# Patient Record
Sex: MALE | Race: WHITE | Employment: FULL TIME | URBAN - METROPOLITAN AREA
[De-identification: names, ages, dates, MRNs, and addresses within clinical notes are randomized per-mention and may not be internally consistent; named-entity substitution may affect disease eponyms.]

---

## 2017-02-16 ENCOUNTER — OFFICE VISIT (OUTPATIENT)
Dept: OTOLARYNGOLOGY | Facility: CLINIC | Age: 37
End: 2017-02-16

## 2017-02-16 ENCOUNTER — OFFICE VISIT (OUTPATIENT)
Dept: AUDIOLOGY | Facility: CLINIC | Age: 37
End: 2017-02-16

## 2017-02-16 VITALS
BODY MASS INDEX: 29.63 KG/M2 | SYSTOLIC BLOOD PRESSURE: 102 MMHG | DIASTOLIC BLOOD PRESSURE: 70 MMHG | WEIGHT: 207 LBS | HEIGHT: 70 IN | TEMPERATURE: 97 F

## 2017-02-16 DIAGNOSIS — R42 DIZZINESS: Primary | ICD-10-CM

## 2017-02-16 DIAGNOSIS — R09.81 NASAL CONGESTION: ICD-10-CM

## 2017-02-16 PROCEDURE — 92550 TYMPANOMETRY & REFLEX THRESH: CPT | Performed by: AUDIOLOGIST

## 2017-02-16 PROCEDURE — 99243 OFF/OP CNSLTJ NEW/EST LOW 30: CPT | Performed by: OTOLARYNGOLOGY

## 2017-02-16 PROCEDURE — 92582 CONDITIONING PLAY AUDIOMETRY: CPT | Performed by: AUDIOLOGIST

## 2017-02-16 PROCEDURE — 99212 OFFICE O/P EST SF 10 MIN: CPT | Performed by: OTOLARYNGOLOGY

## 2017-02-16 RX ORDER — AZELASTINE 1 MG/ML
2 SPRAY, METERED NASAL 2 TIMES DAILY
Qty: 1 BOTTLE | Refills: 0 | Status: SHIPPED | OUTPATIENT
Start: 2017-02-16 | End: 2017-11-09

## 2017-02-16 RX ORDER — PSEUDOEPHEDRINE HCL 120 MG/1
120 TABLET, FILM COATED, EXTENDED RELEASE ORAL EVERY 12 HOURS
Qty: 60 TABLET | Refills: 3 | Status: SHIPPED | OUTPATIENT
Start: 2017-02-16 | End: 2017-04-11

## 2017-02-16 RX ORDER — MONTELUKAST SODIUM 10 MG/1
10 TABLET ORAL NIGHTLY
Qty: 30 TABLET | Refills: 3 | Status: SHIPPED | OUTPATIENT
Start: 2017-02-16 | End: 2017-04-11

## 2017-02-17 NOTE — PROGRESS NOTES
AUDIOLOGY REPORT      Hari Roberson is a 30year old male     Referring Provider: Vesna Segura   YOB: 1980  Medical Record: [id number]      Patient Hearing History:  Patient reported dizziness.       Otoscopic Inspection:  Right ear:  No

## 2017-02-17 NOTE — PROGRESS NOTES
James Velez is a 30year old male.   Patient presents with:  Ear Wax: itchy ears for 4 weeks  Tonsil Problem: enlarged tonsils  Sinus Problem: sinus congestion,post nasal drip  Dizziness: dizziness for 3 weeks      HISTORY OF PRESENT ILLNESS    3 week Negative Tremors. Psych Negative Anxiety and depression. Integumentary Negative Frequent skin infections, pigment change and rash. Hema/Lymph Negative Easy bleeding and easy bruising.            PHYSICAL EXAM    /70 mmHg  Temp(Src) 97.3 °F (36.3 Disp: 30 tablet, Rfl: 3  •  Cyclobenzaprine HCl (FLEXERIL) 5 MG Oral Tab, Take 1 tablet (5 mg total) by mouth 3 (three) times daily as needed for Muscle spasms. , Disp: 20 tablet, Rfl: 0  •  Cetirizine HCl (ZYRTEC OR), Take 1 tablet by mouth as needed. , Dis

## 2017-03-14 ENCOUNTER — APPOINTMENT (OUTPATIENT)
Dept: GENERAL RADIOLOGY | Age: 37
End: 2017-03-14
Attending: EMERGENCY MEDICINE
Payer: COMMERCIAL

## 2017-03-14 ENCOUNTER — HOSPITAL ENCOUNTER (OUTPATIENT)
Age: 37
Discharge: HOME | End: 2017-03-14
Attending: EMERGENCY MEDICINE
Payer: COMMERCIAL

## 2017-03-14 VITALS
HEIGHT: 70 IN | WEIGHT: 205 LBS | DIASTOLIC BLOOD PRESSURE: 74 MMHG | HEART RATE: 74 BPM | RESPIRATION RATE: 16 BRPM | TEMPERATURE: 97 F | OXYGEN SATURATION: 99 % | BODY MASS INDEX: 29.35 KG/M2 | SYSTOLIC BLOOD PRESSURE: 110 MMHG

## 2017-03-14 DIAGNOSIS — J20.8 ACUTE VIRAL BRONCHITIS: Primary | ICD-10-CM

## 2017-03-14 PROCEDURE — 99214 OFFICE O/P EST MOD 30 MIN: CPT

## 2017-03-14 PROCEDURE — 71020 XR CHEST PA + LAT CHEST (CPT=71020): CPT

## 2017-03-14 PROCEDURE — 99213 OFFICE O/P EST LOW 20 MIN: CPT

## 2017-03-14 RX ORDER — BENZONATATE 200 MG/1
200 CAPSULE ORAL 3 TIMES DAILY PRN
Qty: 15 CAPSULE | Refills: 0 | Status: SHIPPED | OUTPATIENT
Start: 2017-03-14 | End: 2017-03-19

## 2017-03-14 NOTE — ED INITIAL ASSESSMENT (HPI)
REPORTS URI 1 A \"LITTLE OVER 1 MONTH AGO\", PATIENT THEN STATES A COUGH DEVELOPED. CHILDREN ILL ALSO DURING THAT TIME. REPORTS HE FEELS LIKE THE COUGH IS WORSENING.  DR. Doron Elizabeth AT THE BEDSIDE.

## 2017-03-14 NOTE — ED PROVIDER NOTES
Patient Seen in: [facility]    History   Patient presents with:  Cough/URI    Stated Complaint: cough/nausea    HPI  Patient with a runny nose, postnasal drip and cough for 3-4 weeks.   Patient was seen by ENT at the onset Status: Never Smoker                      Alcohol Use: Yes           0.0 oz/week       0 Standard drinks or equivalent per week       Comment: 3 x a month      Review of Systems    Positive for stated complaint: cough/nausea  Other systems are as noted in Coordination normal.   Skin: Skin is warm and dry. No rash noted. No pallor. Psychiatric: He has a normal mood and affect. His behavior is normal. Judgment normal.   Nursing note and vitals reviewed.     ED Course          XR CHEST PA + LAT CHEST (CPT=710

## 2017-04-11 ENCOUNTER — OFFICE VISIT (OUTPATIENT)
Dept: INTERNAL MEDICINE CLINIC | Facility: CLINIC | Age: 37
End: 2017-04-11

## 2017-04-11 ENCOUNTER — TELEPHONE (OUTPATIENT)
Dept: INTERNAL MEDICINE CLINIC | Facility: CLINIC | Age: 37
End: 2017-04-11

## 2017-04-11 VITALS
RESPIRATION RATE: 20 BRPM | TEMPERATURE: 98 F | BODY MASS INDEX: 28.49 KG/M2 | OXYGEN SATURATION: 95 % | SYSTOLIC BLOOD PRESSURE: 109 MMHG | HEIGHT: 70 IN | WEIGHT: 199 LBS | DIASTOLIC BLOOD PRESSURE: 72 MMHG | HEART RATE: 72 BPM

## 2017-04-11 DIAGNOSIS — R05.9 COUGH: Primary | ICD-10-CM

## 2017-04-11 DIAGNOSIS — J30.9 ALLERGIC RHINITIS, UNSPECIFIED ALLERGIC RHINITIS TRIGGER, UNSPECIFIED RHINITIS SEASONALITY: ICD-10-CM

## 2017-04-11 PROCEDURE — 99214 OFFICE O/P EST MOD 30 MIN: CPT | Performed by: INTERNAL MEDICINE

## 2017-04-11 PROCEDURE — 99212 OFFICE O/P EST SF 10 MIN: CPT | Performed by: INTERNAL MEDICINE

## 2017-04-11 RX ORDER — LORATADINE 10 MG/1
TABLET ORAL
Qty: 30 TABLET | Refills: 0 | Status: CANCELLED | OUTPATIENT
Start: 2017-04-11

## 2017-04-11 RX ORDER — LORATADINE 10 MG/1
10 TABLET ORAL DAILY
Qty: 90 TABLET | Refills: 0 | Status: SHIPPED | OUTPATIENT
Start: 2017-04-11 | End: 2017-11-09

## 2017-04-11 RX ORDER — AZITHROMYCIN 250 MG/1
TABLET, FILM COATED ORAL
Qty: 6 TABLET | Refills: 0 | Status: CANCELLED | OUTPATIENT
Start: 2017-04-11

## 2017-04-11 RX ORDER — ALBUTEROL SULFATE 90 UG/1
2 AEROSOL, METERED RESPIRATORY (INHALATION) 2 TIMES DAILY PRN
Qty: 1 INHALER | Refills: 0 | Status: CANCELLED | OUTPATIENT
Start: 2017-04-11

## 2017-04-11 RX ORDER — AZITHROMYCIN 250 MG/1
TABLET, FILM COATED ORAL
Qty: 6 TABLET | Refills: 0 | Status: SHIPPED | OUTPATIENT
Start: 2017-04-11 | End: 2017-04-15

## 2017-04-11 RX ORDER — ALBUTEROL SULFATE 90 UG/1
2 AEROSOL, METERED RESPIRATORY (INHALATION) EVERY 4 HOURS PRN
Qty: 1 INHALER | Refills: 1 | Status: SHIPPED | OUTPATIENT
Start: 2017-04-11 | End: 2020-06-10

## 2017-04-11 NOTE — TELEPHONE ENCOUNTER
Patient was just in today to see Dr. Joyce Cochran today at around 11 AM this morning and he states the  Was going to be prescribing several medications including a Z-Pack   Patient states he just called the pharmacy and they have nothing on file I did no

## 2017-04-11 NOTE — TELEPHONE ENCOUNTER
Please advise  Per 4/11/17 OV notes. Patient waiting for medications. Dr. Irwin Soto was metro paged.     Meds This Visit:  Pending Prescriptions  Disp  Refills     loratadine 10 MG Oral Tab  30 tablet  0      Sig: -2  Weeks than prn         azithromy

## 2017-04-11 NOTE — PROGRESS NOTES
HPI:    Patient ID: Keyon Dowling is a 30year old male. Bronchitis  This is a new problem. Episode onset:  10 days  The problem occurs daily. The problem has been gradually worsening.  Associated symptoms include congestion, coughing (with   ylleow p Inhalation Aero Soln Inhale 2 puffs into the lungs every 4 (four) hours as needed for Wheezing. Disp: 1 Inhaler Rfl: 1   loratadine (CLARITIN) 10 MG Oral Tab Take 1 tablet (10 mg total) by mouth daily.  Disp: 90 tablet Rfl: 0     Allergies:Not on File   PHY diagnosis)  z pack fluids  Rest , pt  Education  proair inh  Bid as  Needed     Allergic rhinitis, unspecified allergic rhinitis trigger, unspecified rhinitis seasonality  Patient advised to use Claritin 10 mg plane once daily for 1-2 weeks and then as nee

## 2017-08-24 ENCOUNTER — HOSPITAL ENCOUNTER (OUTPATIENT)
Age: 37
Discharge: HOME | End: 2017-08-24
Attending: FAMILY MEDICINE
Payer: COMMERCIAL

## 2017-08-24 VITALS
TEMPERATURE: 97 F | SYSTOLIC BLOOD PRESSURE: 106 MMHG | BODY MASS INDEX: 29.35 KG/M2 | RESPIRATION RATE: 16 BRPM | DIASTOLIC BLOOD PRESSURE: 89 MMHG | OXYGEN SATURATION: 98 % | HEART RATE: 86 BPM | WEIGHT: 205 LBS | HEIGHT: 70 IN

## 2017-08-24 DIAGNOSIS — K29.00 ACUTE GASTRITIS WITHOUT HEMORRHAGE, UNSPECIFIED GASTRITIS TYPE: Primary | ICD-10-CM

## 2017-08-24 PROCEDURE — 99213 OFFICE O/P EST LOW 20 MIN: CPT

## 2017-08-24 PROCEDURE — 99214 OFFICE O/P EST MOD 30 MIN: CPT

## 2017-08-24 RX ORDER — OMEPRAZOLE 20 MG/1
20 CAPSULE, DELAYED RELEASE ORAL EVERY MORNING
COMMUNITY
End: 2018-05-07

## 2017-08-24 RX ORDER — ONDANSETRON 4 MG/1
4 TABLET, ORALLY DISINTEGRATING ORAL ONCE
Status: COMPLETED | OUTPATIENT
Start: 2017-08-24 | End: 2017-08-24

## 2017-08-24 RX ORDER — ONDANSETRON 4 MG/1
4 TABLET, ORALLY DISINTEGRATING ORAL EVERY 4 HOURS PRN
Qty: 10 TABLET | Refills: 0 | Status: SHIPPED | OUTPATIENT
Start: 2017-08-24 | End: 2017-08-31

## 2017-08-24 NOTE — ED PROVIDER NOTES
Patient Seen in: [facility]    History   Patient presents with:  Abdomen/Flank Pain (GI/)    Stated Complaint: diarrhea    HPI    Pt is a 29 yo who presents with a 2 day h/o non bloody diarrhea and nausea. No fevers.  Ab otherwise stated in HPI.     Physical Exam   ED Triage Vitals [08/24/17 1440]  BP: 106/89  Pulse: 86  Resp: 16  Temp: (!) 97.4 °F (36.3 °C)  Temp src: Oral  SpO2: 98 %  O2 Device: None (Room air)    Current:/89   Pulse 86   Temp (!) 97.4 °F (36.3 °C)

## 2017-11-09 ENCOUNTER — APPOINTMENT (OUTPATIENT)
Dept: LAB | Facility: HOSPITAL | Age: 37
End: 2017-11-09
Attending: PHYSICIAN ASSISTANT
Payer: COMMERCIAL

## 2017-11-09 ENCOUNTER — OFFICE VISIT (OUTPATIENT)
Dept: GASTROENTEROLOGY | Facility: CLINIC | Age: 37
End: 2017-11-09

## 2017-11-09 VITALS
RESPIRATION RATE: 16 BRPM | DIASTOLIC BLOOD PRESSURE: 82 MMHG | WEIGHT: 211.81 LBS | TEMPERATURE: 98 F | SYSTOLIC BLOOD PRESSURE: 129 MMHG | HEIGHT: 71.5 IN | BODY MASS INDEX: 29.01 KG/M2 | HEART RATE: 71 BPM

## 2017-11-09 DIAGNOSIS — K20.0 EOSINOPHILIC ESOPHAGITIS: ICD-10-CM

## 2017-11-09 DIAGNOSIS — R10.11 RUQ DISCOMFORT: Primary | ICD-10-CM

## 2017-11-09 DIAGNOSIS — Z79.899 CURRENT USE OF PROTON PUMP INHIBITOR: ICD-10-CM

## 2017-11-09 DIAGNOSIS — R10.11 RUQ DISCOMFORT: ICD-10-CM

## 2017-11-09 PROCEDURE — 99244 OFF/OP CNSLTJ NEW/EST MOD 40: CPT | Performed by: PHYSICIAN ASSISTANT

## 2017-11-09 PROCEDURE — 80053 COMPREHEN METABOLIC PANEL: CPT

## 2017-11-09 PROCEDURE — 36415 COLL VENOUS BLD VENIPUNCTURE: CPT

## 2017-11-09 PROCEDURE — 99212 OFFICE O/P EST SF 10 MIN: CPT | Performed by: PHYSICIAN ASSISTANT

## 2017-11-09 PROCEDURE — 83735 ASSAY OF MAGNESIUM: CPT

## 2017-11-09 PROCEDURE — 82306 VITAMIN D 25 HYDROXY: CPT

## 2017-11-09 PROCEDURE — 82607 VITAMIN B-12: CPT

## 2017-11-09 NOTE — PATIENT INSTRUCTIONS
1. Benefiber daily - see how this works for your occasional loose stools. Avoiding greasy foods may help this as well. 2. US of the Abdomen + labs    3. Stay on the Omeprazole 20 mg each morning. 4. Complete labs for chronic PPI therapy.     Avoid He

## 2017-11-09 NOTE — H&P
[address] [facility] [address] Gastroenterology                                                                                                  Clinic History and Physical     Pa classic EoE triggers. Instead, he avoids greasy/spicy foods and this helps his reflux therefore he feels this is the best way to keep his upper GI health \"in check\". His BMs have been daily and essentially normal for him.  He will occasionally have lo Cancer Other      great uncle   • Heart Disorder Neg       Social History: Smoking status: Never Smoker                                                              Smokeless tobacco: Never Used                      Alcohol use: Yes           0.0 oz/week distension is appreciated, no overt masses, patient attempts to show me where his \"bulge\" discomfort is, however he is unable to locate it presently +BS  Back: No CVA tenderness  Skin: dry, warm, no jaundice  Ext: no cyanosis, clubbing or edema is eviden Visit:    Orders Placed This Encounter      Magnesium [E]      Vitamin B12 [E]      Vitamin D, 25-Hydroxy      Comp Metabolic Panel (14)    Meds This Visit:    No prescriptions requested or ordered in this encounter    Imaging & Referrals:  US ABDOMEN COMP

## 2017-11-10 ENCOUNTER — TELEPHONE (OUTPATIENT)
Dept: GASTROENTEROLOGY | Facility: CLINIC | Age: 37
End: 2017-11-10

## 2017-11-10 NOTE — TELEPHONE ENCOUNTER
I called patient at preferred number to let him know I discussed his case with Dr. Hugo Valdovinos who agrees with my recommendations. We may pursue additional imaging after the ultrasound if clinically warranted.      A [employer] prompt stated that the number is unav

## 2017-11-10 NOTE — TELEPHONE ENCOUNTER
Insufficient Vitamin D - patient can take 2000 IU OTC daily. I would advise him to follow up with Dr. Najma Brooks re: high protein/globulin. Remainder of labs were normal.    Unable to reach patient. Please disclose when he calls back.  He is not on M

## 2017-11-10 NOTE — TELEPHONE ENCOUNTER
Pt notified of results and Jorge Luis Corrales f/u recommendations. He verbalizes understanding. He states he can sign up for \"my chart\".

## 2018-05-07 ENCOUNTER — OFFICE VISIT (OUTPATIENT)
Dept: INTERNAL MEDICINE CLINIC | Facility: CLINIC | Age: 38
End: 2018-05-07

## 2018-05-07 VITALS
DIASTOLIC BLOOD PRESSURE: 73 MMHG | WEIGHT: 213.38 LBS | SYSTOLIC BLOOD PRESSURE: 121 MMHG | BODY MASS INDEX: 30.55 KG/M2 | HEIGHT: 70 IN | TEMPERATURE: 98 F | RESPIRATION RATE: 16 BRPM | HEART RATE: 82 BPM

## 2018-05-07 DIAGNOSIS — K21.9 GASTROESOPHAGEAL REFLUX DISEASE, ESOPHAGITIS PRESENCE NOT SPECIFIED: Primary | ICD-10-CM

## 2018-05-07 PROCEDURE — 99212 OFFICE O/P EST SF 10 MIN: CPT | Performed by: INTERNAL MEDICINE

## 2018-05-07 PROCEDURE — 99214 OFFICE O/P EST MOD 30 MIN: CPT | Performed by: INTERNAL MEDICINE

## 2018-05-07 NOTE — PROGRESS NOTES
HPI:    Patient ID: Nichelle Shaikh is a 41year old male. Gastro-esophageal Reflux   He reports no abdominal pain or no chest pain. This is a chronic problem. The current episode started more than 1 year ago. The problem occurs occasionally.  The probl Suspension Take 20 mg by mouth daily. Disp:  Rfl:      Allergies:  Bananas                 ITCHING   PHYSICAL EXAM:   Physical Exam   Constitutional: He is oriented to person, place, and time. He appears well-developed and well-nourished.    HENT:   Head: N Ashkan, 5/7/2018, 1:23 PM.      JUHI HURST MD,  personally performed the services described in this documentation. All medical record entries made by the scribe were at my direction and in my presence.   I have reviewed the chart and discharge ins

## 2018-09-28 ENCOUNTER — HOSPITAL ENCOUNTER (OUTPATIENT)
Age: 38
Discharge: HOME | End: 2018-09-28
Attending: EMERGENCY MEDICINE
Payer: COMMERCIAL

## 2018-09-28 VITALS
HEIGHT: 70 IN | DIASTOLIC BLOOD PRESSURE: 76 MMHG | SYSTOLIC BLOOD PRESSURE: 135 MMHG | HEART RATE: 70 BPM | OXYGEN SATURATION: 97 % | TEMPERATURE: 98 F | RESPIRATION RATE: 18 BRPM | WEIGHT: 200 LBS | BODY MASS INDEX: 28.63 KG/M2

## 2018-09-28 DIAGNOSIS — H10.10 ALLERGIC CONJUNCTIVITIS, UNSPECIFIED LATERALITY: Primary | ICD-10-CM

## 2018-09-28 DIAGNOSIS — J30.2 SEASONAL ALLERGIES: ICD-10-CM

## 2018-09-28 PROCEDURE — 99214 OFFICE O/P EST MOD 30 MIN: CPT

## 2018-09-28 PROCEDURE — 99213 OFFICE O/P EST LOW 20 MIN: CPT

## 2018-09-28 RX ORDER — FLUTICASONE PROPIONATE 50 MCG
1-2 SPRAY, SUSPENSION (ML) NASAL DAILY
Qty: 16 G | Refills: 0 | Status: SHIPPED | OUTPATIENT
Start: 2018-09-28 | End: 2018-09-28

## 2018-09-28 RX ORDER — OLOPATADINE HYDROCHLORIDE 1 MG/ML
1 SOLUTION/ DROPS OPHTHALMIC 2 TIMES DAILY
Qty: 1 BOTTLE | Refills: 0 | Status: SHIPPED | OUTPATIENT
Start: 2018-09-28 | End: 2018-09-28

## 2018-09-28 RX ORDER — FLUTICASONE PROPIONATE 50 MCG
1-2 SPRAY, SUSPENSION (ML) NASAL DAILY
Qty: 16 G | Refills: 0 | Status: SHIPPED | OUTPATIENT
Start: 2018-09-28 | End: 2018-10-28

## 2018-09-28 RX ORDER — MONTELUKAST SODIUM 10 MG/1
10 TABLET ORAL NIGHTLY
Qty: 30 TABLET | Refills: 0 | Status: SHIPPED | OUTPATIENT
Start: 2018-09-28 | End: 2018-09-28

## 2018-09-28 RX ORDER — OLOPATADINE HYDROCHLORIDE 1 MG/ML
1 SOLUTION/ DROPS OPHTHALMIC 2 TIMES DAILY
Qty: 1 BOTTLE | Refills: 0 | Status: SHIPPED | OUTPATIENT
Start: 2018-09-28 | End: 2020-06-10

## 2018-09-28 RX ORDER — MONTELUKAST SODIUM 10 MG/1
10 TABLET ORAL NIGHTLY
Qty: 30 TABLET | Refills: 0 | Status: SHIPPED | OUTPATIENT
Start: 2018-09-28 | End: 2018-10-28

## 2018-09-28 NOTE — ED PROVIDER NOTES
Patient Seen in: [facility]    History   Patient presents with:   Eye Visual Problem (opthalmic)    Stated Complaint: eye pain     HPI  For the last many days patient has noted increased runny nose, nasal congestion and it All other systems reviewed and negative except as noted above.     Physical Exam     ED Triage Vitals [09/28/18 1210]   /76   Pulse 70   Resp 18   Temp 97.9 °F (36.6 °C)   Temp src Oral   SpO2 97 %   O2 Device None (Room air)       Current:/ discussed with the patient at length. Strategies for allergy treatment and resuming his previous allergy regimen was advised. He was given renewed prescriptions. Follow-up, return and home management was discussed.   If symptoms worsen or new or concerni

## 2019-03-14 ENCOUNTER — APPOINTMENT (OUTPATIENT)
Dept: GENERAL RADIOLOGY | Age: 39
End: 2019-03-14
Attending: NURSE PRACTITIONER
Payer: COMMERCIAL

## 2019-03-14 ENCOUNTER — HOSPITAL ENCOUNTER (OUTPATIENT)
Age: 39
Discharge: HOME | End: 2019-03-14
Payer: COMMERCIAL

## 2019-03-14 VITALS
HEIGHT: 70 IN | WEIGHT: 205 LBS | HEART RATE: 114 BPM | DIASTOLIC BLOOD PRESSURE: 69 MMHG | BODY MASS INDEX: 29.35 KG/M2 | SYSTOLIC BLOOD PRESSURE: 119 MMHG | TEMPERATURE: 101 F | OXYGEN SATURATION: 96 % | RESPIRATION RATE: 20 BRPM

## 2019-03-14 DIAGNOSIS — J11.1 INFLUENZA: Primary | ICD-10-CM

## 2019-03-14 LAB
POCT INFLUENZA A: POSITIVE
POCT INFLUENZA B: NEGATIVE
S PYO AG THROAT QL: NEGATIVE

## 2019-03-14 PROCEDURE — 87502 INFLUENZA DNA AMP PROBE: CPT | Performed by: NURSE PRACTITIONER

## 2019-03-14 PROCEDURE — 99214 OFFICE O/P EST MOD 30 MIN: CPT

## 2019-03-14 PROCEDURE — 99213 OFFICE O/P EST LOW 20 MIN: CPT

## 2019-03-14 PROCEDURE — 87430 STREP A AG IA: CPT

## 2019-03-14 PROCEDURE — 71046 X-RAY EXAM CHEST 2 VIEWS: CPT | Performed by: NURSE PRACTITIONER

## 2019-03-14 RX ORDER — ALBUTEROL SULFATE 90 UG/1
2 AEROSOL, METERED RESPIRATORY (INHALATION) EVERY 4 HOURS PRN
Qty: 1 INHALER | Refills: 0 | Status: SHIPPED | OUTPATIENT
Start: 2019-03-14 | End: 2019-04-13

## 2019-03-14 RX ORDER — PREDNISONE 20 MG/1
40 TABLET ORAL DAILY
Qty: 10 TABLET | Refills: 0 | Status: SHIPPED | OUTPATIENT
Start: 2019-03-14 | End: 2019-03-19

## 2019-03-14 NOTE — ED PROVIDER NOTES
No chief complaint on file. HPI:     Leah Rangel is a 42year old male with no significant past medical history presents with a chief complaint of cough, runny nose, vomiting, sore throat and shortness of breath.   Patient reports symptoms started patient and he declines at this time. X-ray findings reviewed, no acute cardiopulmonary disease. Patient is nontoxic in appearance, awake and alert throughout examination. Easy respirations with clear speech. Discussed findings with patient.   Also disc

## 2019-03-14 NOTE — ED INITIAL ASSESSMENT (HPI)
PATIENT ARRIVED AMBULATORY TO ROOM WITH FAMILY. SYMPTOMS STARTED YESTERDAY. +CONGESTED COUGH. +NASAL CONGESTION. +VOMITING. EASY NONLABORED RESPIRATIONS.

## 2019-07-10 ENCOUNTER — OFFICE VISIT (OUTPATIENT)
Dept: GASTROENTEROLOGY | Facility: CLINIC | Age: 39
End: 2019-07-10
Payer: COMMERCIAL

## 2019-07-10 ENCOUNTER — APPOINTMENT (OUTPATIENT)
Dept: LAB | Age: 39
End: 2019-07-10
Attending: INTERNAL MEDICINE
Payer: COMMERCIAL

## 2019-07-10 VITALS
SYSTOLIC BLOOD PRESSURE: 111 MMHG | HEIGHT: 70 IN | BODY MASS INDEX: 29.44 KG/M2 | WEIGHT: 205.63 LBS | HEART RATE: 97 BPM | DIASTOLIC BLOOD PRESSURE: 67 MMHG

## 2019-07-10 DIAGNOSIS — R10.11 RUQ ABDOMINAL PAIN: ICD-10-CM

## 2019-07-10 DIAGNOSIS — R19.4 CHANGE IN BOWEL HABITS: ICD-10-CM

## 2019-07-10 DIAGNOSIS — K20.0 EOSINOPHILIC ESOPHAGITIS: ICD-10-CM

## 2019-07-10 DIAGNOSIS — K20.0 EOSINOPHILIC ESOPHAGITIS: Primary | ICD-10-CM

## 2019-07-10 LAB
ALBUMIN SERPL-MCNC: 4.2 G/DL (ref 3.4–5)
ALBUMIN/GLOB SERPL: 0.9 {RATIO} (ref 1–2)
ALP LIVER SERPL-CCNC: 73 U/L (ref 45–117)
ALT SERPL-CCNC: 24 U/L (ref 16–61)
ANION GAP SERPL CALC-SCNC: 6 MMOL/L (ref 0–18)
AST SERPL-CCNC: 15 U/L (ref 15–37)
BILIRUB SERPL-MCNC: 0.6 MG/DL (ref 0.1–2)
BUN BLD-MCNC: 18 MG/DL (ref 7–18)
BUN/CREAT SERPL: 14.5 (ref 10–20)
CALCIUM BLD-MCNC: 9.5 MG/DL (ref 8.5–10.1)
CHLORIDE SERPL-SCNC: 100 MMOL/L (ref 98–112)
CO2 SERPL-SCNC: 28 MMOL/L (ref 21–32)
CREAT BLD-MCNC: 1.24 MG/DL (ref 0.7–1.3)
GLOBULIN PLAS-MCNC: 4.9 G/DL (ref 2.8–4.4)
GLUCOSE BLD-MCNC: 85 MG/DL (ref 70–99)
IGA SERPL-MCNC: 529 MG/DL (ref 70–312)
M PROTEIN MFR SERPL ELPH: 9.1 G/DL (ref 6.4–8.2)
OSMOLALITY SERPL CALC.SUM OF ELEC: 279 MOSM/KG (ref 275–295)
PATIENT FASTING: NO
POTASSIUM SERPL-SCNC: 3.7 MMOL/L (ref 3.5–5.1)
SODIUM SERPL-SCNC: 134 MMOL/L (ref 136–145)

## 2019-07-10 PROCEDURE — 36415 COLL VENOUS BLD VENIPUNCTURE: CPT

## 2019-07-10 PROCEDURE — 82784 ASSAY IGA/IGD/IGG/IGM EACH: CPT

## 2019-07-10 PROCEDURE — 86256 FLUORESCENT ANTIBODY TITER: CPT

## 2019-07-10 PROCEDURE — 83516 IMMUNOASSAY NONANTIBODY: CPT

## 2019-07-10 PROCEDURE — 99214 OFFICE O/P EST MOD 30 MIN: CPT | Performed by: INTERNAL MEDICINE

## 2019-07-10 PROCEDURE — 80053 COMPREHEN METABOLIC PANEL: CPT

## 2019-07-10 RX ORDER — CLOBETASOL PROPIONATE 0.5 MG/G
0.05 AEROSOL, FOAM TOPICAL AS DIRECTED
Refills: 5 | COMMUNITY
Start: 2019-07-01 | End: 2020-08-14

## 2019-07-10 NOTE — PROGRESS NOTES
HPI:    Patient ID: Saravanan Zepeda returns today for follow-up. We discussed several issues today:    1. Eosinophilic esophagitis and dysphagia    Esophageal symptoms are tolerable. He states swallowing is \"not really an issue. \"  In retrospect, he and subsequent diagnosis of eosinophilic esophagitis. Comes in taking Claritin, fluticasone/Flonase, omeprazole oral suspension. Results letter from last time was mailed to a previous address from 2012. He never received it.   Comes in asking about h 9:30 PM in the emergency room. For over 2 hours, he was unable to swallow secretions or saliva without regurgitating it back up. Assessment in the emergency room was that he appeared to be completely obstructed.   After retching, vomiting up the meat bolu no back pain    Past medical history: Gastroesophageal reflux disease  Past surgical history: No significant  Social history: The patient is a nonsmoker    Past Medical History  History of Medical Problems:  Yes  GI History:  GERD    Allergies  Allergies: 1712 (PCP)        Ameena Gastelum MD       [id number]  Patient Instructions:  ED Foreign Body Esophageal Rslv      Sid Wellington MD        [id number]    =======================    Angela Dent    Date of Exam:  04/02/2013 4/02/2013    ULTRASOUND OF THE ABDOM and risks of EGD examination and esophageal dilation were discussed with  Bhavin Coates and his questions answered, his informed consent was obtained.  He was interviewed and examined, sedated by the anesthesiologist and nurse anesthetist. Once sedated, the Kateryna here. Biopsies taken of the distal and mid esophagus. Air and secretions were suctioned out of the stomach and esophagus and the scope was withdrawn from the patient. He tolerated this relatively brief procedure well. IMPRESSION:   1.  History and findings June 11, 2015. Symptoms were very concerning for possibilities including eosinophilic esophagitis, simple peptic esophageal stricture, less likely neoplasm.     EGD examination with esophageal biopsy and dilation of esophageal stricture performed June 19, approximately since 2012, worsening past 2 years  · Reassuring ultrasound 2013 as above. · Repeat abdominal ultrasound ordered today. · Celiac serologies ordered today.   · Could consider EGD examination in the future        Over 25 minutes spent today di

## 2019-07-15 LAB — TTG IGA SER-ACNC: 2 U/ML (ref ?–7)

## 2019-08-05 ENCOUNTER — TELEPHONE (OUTPATIENT)
Dept: GASTROENTEROLOGY | Facility: CLINIC | Age: 39
End: 2019-08-05

## 2019-08-05 NOTE — TELEPHONE ENCOUNTER
----- Message from Carmenza Gale MD sent at 8/3/2019  2:43 PM CDT -----  GI RNs–  Please call Anais Yaritza Miller to advise that his recent blood tests 7/10/2019 came back looking good. He tested negative for celiac disease.   His liver enzymes looked go

## 2019-08-14 ENCOUNTER — TELEPHONE (OUTPATIENT)
Dept: GASTROENTEROLOGY | Facility: CLINIC | Age: 39
End: 2019-08-14

## 2019-10-11 ENCOUNTER — APPOINTMENT (OUTPATIENT)
Dept: GENERAL RADIOLOGY | Age: 39
End: 2019-10-11
Attending: EMERGENCY MEDICINE
Payer: COMMERCIAL

## 2019-10-11 ENCOUNTER — HOSPITAL ENCOUNTER (OUTPATIENT)
Age: 39
Discharge: HOME | End: 2019-10-11
Attending: EMERGENCY MEDICINE
Payer: COMMERCIAL

## 2019-10-11 VITALS
SYSTOLIC BLOOD PRESSURE: 121 MMHG | OXYGEN SATURATION: 98 % | DIASTOLIC BLOOD PRESSURE: 60 MMHG | BODY MASS INDEX: 29 KG/M2 | WEIGHT: 205 LBS | HEART RATE: 76 BPM | RESPIRATION RATE: 18 BRPM | TEMPERATURE: 98 F

## 2019-10-11 DIAGNOSIS — M79.675 PAIN OF TOE OF LEFT FOOT: Primary | ICD-10-CM

## 2019-10-11 PROCEDURE — 99214 OFFICE O/P EST MOD 30 MIN: CPT

## 2019-10-11 PROCEDURE — 99213 OFFICE O/P EST LOW 20 MIN: CPT

## 2019-10-11 PROCEDURE — 73630 X-RAY EXAM OF FOOT: CPT | Performed by: EMERGENCY MEDICINE

## 2019-10-11 RX ORDER — CEFADROXIL 500 MG/1
500 CAPSULE ORAL 2 TIMES DAILY
Qty: 20 CAPSULE | Refills: 0 | Status: SHIPPED | OUTPATIENT
Start: 2019-10-11 | End: 2019-10-21

## 2019-10-11 NOTE — ED PROVIDER NOTES
Patient Seen in: [facility]      History   Patient presents with:  Musculoskeletal Problem    Stated Complaint: LEFT MIDDLE TOE PAIN DIFFICULTY WALKING    HPI    Patient complains of left third toe pain for the last 3 day Exam    The patient is awake and alert  Extremities on exam of the left foot there is mild diffuse swelling of the left third toe. There is erythema present over the distal phalanx. There are no wounds or abrasions noted.   There is tenderness on palpatio capsule (500 mg total) by mouth 2 (two) times daily for 10 days.   Qty: 20 capsule Refills: 0

## 2019-10-28 ENCOUNTER — HOSPITAL ENCOUNTER (OUTPATIENT)
Age: 39
Discharge: HOME | End: 2019-10-28
Payer: COMMERCIAL

## 2019-10-28 VITALS
HEIGHT: 70 IN | SYSTOLIC BLOOD PRESSURE: 117 MMHG | WEIGHT: 197 LBS | TEMPERATURE: 99 F | DIASTOLIC BLOOD PRESSURE: 77 MMHG | OXYGEN SATURATION: 97 % | RESPIRATION RATE: 18 BRPM | BODY MASS INDEX: 28.2 KG/M2 | HEART RATE: 105 BPM

## 2019-10-28 DIAGNOSIS — J02.0 STREPTOCOCCAL SORE THROAT: Primary | ICD-10-CM

## 2019-10-28 PROCEDURE — 99214 OFFICE O/P EST MOD 30 MIN: CPT

## 2019-10-28 PROCEDURE — 87430 STREP A AG IA: CPT

## 2019-10-28 PROCEDURE — 99213 OFFICE O/P EST LOW 20 MIN: CPT

## 2019-10-28 RX ORDER — AMOXICILLIN 875 MG/1
875 TABLET, COATED ORAL 2 TIMES DAILY
Qty: 20 TABLET | Refills: 0 | Status: SHIPPED | OUTPATIENT
Start: 2019-10-28 | End: 2019-11-07

## 2019-10-28 NOTE — ED PROVIDER NOTES
Patient presents with:  Sore Throat      HPI:     Amber Jesus is a 42year old male with no significant past medical history presents with chief complaint of sore throat and tactile fever which started Saturday.   No posterior neck pain or neck stiffne tolerated in the past.  Patient verbalized plan of care and states understanding.         Orders Placed This Encounter      POCT Rapid Strep Once      POCT Rapid Strep      amoxicillin 875 MG Oral Tab          Sig: Take 1 tablet (875 mg total) by mouth 2 (t

## 2019-10-31 ENCOUNTER — OFFICE VISIT (OUTPATIENT)
Dept: PODIATRY CLINIC | Facility: CLINIC | Age: 39
End: 2019-10-31
Payer: COMMERCIAL

## 2019-10-31 DIAGNOSIS — D36.10 NEUROMA: Primary | ICD-10-CM

## 2019-10-31 PROCEDURE — 99203 OFFICE O/P NEW LOW 30 MIN: CPT | Performed by: PODIATRIST

## 2019-10-31 NOTE — PROGRESS NOTES
HPI:    Patient ID: Judy Oreilly is a 42year old male. This 63-year-old male presents as a new patient to me and states that he is self-referred. He is here because of pain primarily in the left forefoot.   He describes a sense of swelling on the bal taken at the urgent care center in the last week that is negative for bone pathology. He states that they actually put him on an antibiotic and it made no difference. Symptoms are quite consistent and classic for neuroma second interspace left foot.   He

## 2019-12-02 ENCOUNTER — APPOINTMENT (OUTPATIENT)
Dept: ULTRASOUND IMAGING | Facility: HOSPITAL | Age: 39
End: 2019-12-02
Payer: COMMERCIAL

## 2019-12-02 ENCOUNTER — HOSPITAL ENCOUNTER (OUTPATIENT)
Age: 39
Discharge: ANOTHER HOSPITAL | End: 2019-12-02
Payer: COMMERCIAL

## 2019-12-02 ENCOUNTER — HOSPITAL ENCOUNTER (EMERGENCY)
Facility: HOSPITAL | Age: 39
Discharge: HOME | End: 2019-12-02
Payer: COMMERCIAL

## 2019-12-02 VITALS
WEIGHT: 180 LBS | TEMPERATURE: 98 F | RESPIRATION RATE: 18 BRPM | SYSTOLIC BLOOD PRESSURE: 114 MMHG | DIASTOLIC BLOOD PRESSURE: 75 MMHG | HEART RATE: 85 BPM | OXYGEN SATURATION: 96 % | HEIGHT: 70 IN | BODY MASS INDEX: 25.77 KG/M2

## 2019-12-02 VITALS
OXYGEN SATURATION: 98 % | RESPIRATION RATE: 18 BRPM | WEIGHT: 192 LBS | TEMPERATURE: 98 F | SYSTOLIC BLOOD PRESSURE: 121 MMHG | HEART RATE: 72 BPM | BODY MASS INDEX: 28 KG/M2 | DIASTOLIC BLOOD PRESSURE: 84 MMHG

## 2019-12-02 DIAGNOSIS — M71.21 SYNOVIAL CYST OF RIGHT POPLITEAL SPACE: Primary | ICD-10-CM

## 2019-12-02 DIAGNOSIS — M79.89 SWELLING OF CALF: Primary | ICD-10-CM

## 2019-12-02 DIAGNOSIS — M79.661 RIGHT CALF PAIN: ICD-10-CM

## 2019-12-02 PROCEDURE — 99213 OFFICE O/P EST LOW 20 MIN: CPT

## 2019-12-02 PROCEDURE — 93971 EXTREMITY STUDY: CPT

## 2019-12-02 PROCEDURE — 99284 EMERGENCY DEPT VISIT MOD MDM: CPT

## 2019-12-02 PROCEDURE — 99212 OFFICE O/P EST SF 10 MIN: CPT

## 2019-12-02 RX ORDER — NAPROXEN 500 MG/1
500 TABLET ORAL 2 TIMES DAILY PRN
Qty: 20 TABLET | Refills: 0 | Status: SHIPPED | OUTPATIENT
Start: 2019-12-02 | End: 2019-12-09

## 2019-12-02 NOTE — ED PROVIDER NOTES
Patient presents with:  Knee Pain      HPI:     [facility] is a 42year old male who presents today with a chief complaint of pain in the right knee that started a couple days ago.   The patient states he was doing some exercises recommended for tendi on file    Lifestyle      Physical activity:        Days per week: Not on file        Minutes per session: Not on file      Stress: Not on file    Relationships      Social connections:        Talks on phone: Not on file        Gets together: Not on file Wt 87.1 kg   SpO2 98%   BMI 27.55 kg/m²   GENERAL: well developed, well nourished, well hydrated, no distress  SKIN: good skin turgor, no obvious rashes. No redness to the RLE. Skin intact.    HEENT: atraumatic, normocephalic, ears, nose and throat are cl

## 2019-12-02 NOTE — ED INITIAL ASSESSMENT (HPI)
PATIENT STATES 2 WEEKS AGO HE DID SOME RIGHT KNEE EXERCISES FOR TENDONITIS. STATES HE DEVELOPED RIGHT KNEE PAIN AFTER THE EXERCISES FOLLOWED BY SOME SWELLING. NOW WITH SWELLING TO RIGHT CALF. DENIES RECENT TRAVEL.   TAKING IBUPROFEN FOR PAIN WITHOUT RELI

## 2019-12-03 NOTE — ED PROVIDER NOTES
Patient Seen in: [facility] AND [facility]      History   Patient presents with:  Leg Pain    Stated Complaint: R leg pain     HPI    82-year-old male presents from immediate care for evaluation of possible DVT.   Patient with right knee and motion and neck supple. No neck rigidity. Cardiovascular:      Rate and Rhythm: Normal rate and regular rhythm. Pulses:           Posterior tibial pulses are 1+ on the right side and 1+ on the left side.    Pulmonary:      Effort: Pulmonary effort is

## 2019-12-03 NOTE — ED NOTES
Pt dcd to home aox4, ambulatory with steady gait, discharge instruction given and voices understanding, prescription given, denies any concern

## 2019-12-23 ENCOUNTER — HOSPITAL ENCOUNTER (OUTPATIENT)
Dept: GENERAL RADIOLOGY | Facility: HOSPITAL | Age: 39
Discharge: HOME | End: 2019-12-23
Attending: ORTHOPAEDIC SURGERY
Payer: COMMERCIAL

## 2019-12-23 ENCOUNTER — OFFICE VISIT (OUTPATIENT)
Dept: ORTHOPEDICS CLINIC | Facility: CLINIC | Age: 39
End: 2019-12-23
Payer: COMMERCIAL

## 2019-12-23 VITALS
WEIGHT: 180 LBS | BODY MASS INDEX: 25.77 KG/M2 | DIASTOLIC BLOOD PRESSURE: 78 MMHG | HEIGHT: 70 IN | SYSTOLIC BLOOD PRESSURE: 120 MMHG | HEART RATE: 75 BPM

## 2019-12-23 DIAGNOSIS — M25.561 RIGHT KNEE PAIN, UNSPECIFIED CHRONICITY: ICD-10-CM

## 2019-12-23 DIAGNOSIS — M25.461 EFFUSION OF RIGHT KNEE: ICD-10-CM

## 2019-12-23 DIAGNOSIS — M25.561 RIGHT KNEE PAIN, UNSPECIFIED CHRONICITY: Primary | ICD-10-CM

## 2019-12-23 PROCEDURE — 99243 OFF/OP CNSLTJ NEW/EST LOW 30: CPT | Performed by: ORTHOPAEDIC SURGERY

## 2019-12-23 PROCEDURE — 20610 DRAIN/INJ JOINT/BURSA W/O US: CPT | Performed by: ORTHOPAEDIC SURGERY

## 2019-12-23 PROCEDURE — 73564 X-RAY EXAM KNEE 4 OR MORE: CPT | Performed by: ORTHOPAEDIC SURGERY

## 2019-12-23 RX ORDER — TRIAMCINOLONE ACETONIDE 40 MG/ML
40 INJECTION, SUSPENSION INTRA-ARTICULAR; INTRAMUSCULAR ONCE
Status: COMPLETED | OUTPATIENT
Start: 2019-12-23 | End: 2019-12-23

## 2019-12-23 NOTE — PROGRESS NOTES
NURSING INTAKE COMMENTS: Patient presents with:  Consult: C/o right knee pain for about 6 weeks. Pain occurs when bending, or when sitting. Stts he's noticed some instability and swelling. Recalls no injuries. Has taken ibuprofen with relief.       HPI: use: Yes        Alcohol/week: 0.0 standard drinks        Comment: 3 x a month      Drug use: No      Sexual activity: Not on file       Review of Systems:  GENERAL: feels generally well, no significant weight loss or weight gain  SKIN: no ulcerated or worr small saphenous, posterior tibial, and peroneal veins appear normal.    THROMBI: None visible. COMPRESSIBILITY: Normal. OTHER: Mildly complex Baker's cyst popliteal fossa measures 9.2 x 2.2 x 2.9 cm.   Complex low level echoes may be from previous hemorrhag typos.    Wilber Whitt MD

## 2019-12-24 NOTE — PROGRESS NOTES
Verbal order given by Dr. Paolo Bernal to draw up 4 cc 0.5% marcaine, and 1 cc kenalog 40 for a right knee injection.   Also, set up an aspiration right knee

## 2020-02-12 NOTE — TELEPHONE ENCOUNTER
2nd reminder sent via [employer] on 11/27/19.    3rd reminder letter mailed out to patient today.

## 2020-05-04 ENCOUNTER — E-VISIT (OUTPATIENT)
Dept: FAMILY MEDICINE CLINIC | Facility: CLINIC | Age: 40
End: 2020-05-04

## 2020-05-04 DIAGNOSIS — J45.909 ASTHMA DUE TO SEASONAL ALLERGIES: Primary | ICD-10-CM

## 2020-05-04 PROCEDURE — 99421 OL DIG E/M SVC 5-10 MIN: CPT | Performed by: NURSE PRACTITIONER

## 2020-05-04 RX ORDER — MONTELUKAST SODIUM 10 MG/1
10 TABLET ORAL NIGHTLY
Qty: 60 TABLET | Refills: 0 | Status: SHIPPED | OUTPATIENT
Start: 2020-05-04 | End: 2020-06-10

## 2020-05-04 RX ORDER — ALBUTEROL SULFATE 90 UG/1
AEROSOL, METERED RESPIRATORY (INHALATION)
Qty: 1 INHALER | Refills: 0 | Status: SHIPPED | OUTPATIENT
Start: 2020-05-04

## 2020-05-04 NOTE — PROGRESS NOTES
Judy  is a 47year old male. HPI:   See answers to questions above.      Current Outpatient Medications   Medication Sig Dispense Refill   • Albuterol Sulfate  (90 Base) MCG/ACT Inhalation Aero Soln 2 puffs every four hours as needed for appointment. Advised that if no improvement after 3 days in cough that he should see PCP for possible phone or video visit. Educated on medication side effects, risks and benefits. Educated on worsening symptoms and when to seek a higher level of care.  Jeanine Maldonado

## 2020-05-09 NOTE — PATIENT INSTRUCTIONS
Seasonal Allergy  Seasonal allergy is also called hay fever. An allergic reaction may occur after a person is exposed to pollens released from grasses, weeds, trees, and shrubs.  This type of allergy occurs during the spring, summer, or fall when pollens · Antihistamines block the release of histamine during the allergic response. They may work better when taken before symptoms develop.  Unless a prescription antihistamine was prescribed, you can take over-the-counter antihistamines that don't cause drowsin Call your healthcare provider right away for any of the following:  · Facial, ear or sinus pain; colored drainage from the nose  · Headaches  · You have asthma and your asthma symptoms don't respond to the usual doses of your medicine  · Cough with colored

## 2020-06-10 ENCOUNTER — OFFICE VISIT (OUTPATIENT)
Dept: INTERNAL MEDICINE CLINIC | Facility: CLINIC | Age: 40
End: 2020-06-10
Payer: COMMERCIAL

## 2020-06-10 VITALS
SYSTOLIC BLOOD PRESSURE: 125 MMHG | BODY MASS INDEX: 28.49 KG/M2 | HEIGHT: 70 IN | HEART RATE: 73 BPM | TEMPERATURE: 98 F | DIASTOLIC BLOOD PRESSURE: 89 MMHG | WEIGHT: 199 LBS

## 2020-06-10 DIAGNOSIS — M25.50 ARTHRALGIA, UNSPECIFIED JOINT: ICD-10-CM

## 2020-06-10 DIAGNOSIS — Z00.00 PHYSICAL EXAM: Primary | ICD-10-CM

## 2020-06-10 DIAGNOSIS — J45.909 ASTHMA DUE TO SEASONAL ALLERGIES: ICD-10-CM

## 2020-06-10 PROCEDURE — 99395 PREV VISIT EST AGE 18-39: CPT | Performed by: PHYSICIAN ASSISTANT

## 2020-06-10 RX ORDER — MONTELUKAST SODIUM 10 MG/1
10 TABLET ORAL NIGHTLY
Qty: 60 TABLET | Refills: 0 | Status: SHIPPED | OUTPATIENT
Start: 2020-06-10 | End: 2020-08-09

## 2020-06-10 NOTE — PROGRESS NOTES
HPI:    Patient ID: Jaye Gonzalez is a 47year old male. HPI   Patient is new to me presents for a physical. States dong generally well. Does have intermittent joint pains, left shoulder and elbow, right knee. Been worse over the past year.  Did play uncle   • Heart Disorder Neg          PHYSICAL EXAM:   /89 (BP Location: Left arm, Patient Position: Sitting, Cuff Size: adult)   Pulse 73   Temp 97.5 °F (36.4 °C) (Oral)   Ht 5' 10\" (1.778 m)   Wt 199 lb (90.3 kg)   BMI 28.55 kg/m²   Body mass inde tablet; Refill: 0    3.  Arthralgia, unspecified joint  -morning stretches and continue physical activity, tylenol PRN no advil due to GERD, follow up with ortho for knee pain  Orders Placed This Encounter      CBC W Differential W Platelet [E]      Comp Me

## 2020-06-11 ENCOUNTER — LAB ENCOUNTER (OUTPATIENT)
Dept: LAB | Age: 40
End: 2020-06-11
Attending: ANESTHESIOLOGY
Payer: COMMERCIAL

## 2020-06-11 DIAGNOSIS — Z00.00 PHYSICAL EXAM: ICD-10-CM

## 2020-06-11 PROCEDURE — 81003 URINALYSIS AUTO W/O SCOPE: CPT

## 2020-06-11 PROCEDURE — 80053 COMPREHEN METABOLIC PANEL: CPT

## 2020-06-11 PROCEDURE — 85025 COMPLETE CBC W/AUTO DIFF WBC: CPT

## 2020-06-11 PROCEDURE — 36415 COLL VENOUS BLD VENIPUNCTURE: CPT

## 2020-06-11 PROCEDURE — 80061 LIPID PANEL: CPT

## 2020-06-11 PROCEDURE — 84443 ASSAY THYROID STIM HORMONE: CPT

## 2020-08-10 ENCOUNTER — APPOINTMENT (OUTPATIENT)
Dept: GENERAL RADIOLOGY | Age: 40
End: 2020-08-10
Attending: NURSE PRACTITIONER
Payer: COMMERCIAL

## 2020-08-10 ENCOUNTER — HOSPITAL ENCOUNTER (OUTPATIENT)
Age: 40
Discharge: HOME | End: 2020-08-10
Payer: COMMERCIAL

## 2020-08-10 VITALS
SYSTOLIC BLOOD PRESSURE: 119 MMHG | OXYGEN SATURATION: 97 % | HEART RATE: 84 BPM | DIASTOLIC BLOOD PRESSURE: 76 MMHG | TEMPERATURE: 98 F | RESPIRATION RATE: 18 BRPM

## 2020-08-10 DIAGNOSIS — R05.9 COUGH: Primary | ICD-10-CM

## 2020-08-10 PROCEDURE — 99214 OFFICE O/P EST MOD 30 MIN: CPT

## 2020-08-10 PROCEDURE — 71046 X-RAY EXAM CHEST 2 VIEWS: CPT | Performed by: NURSE PRACTITIONER

## 2020-08-10 RX ORDER — PREDNISONE 20 MG/1
40 TABLET ORAL DAILY
Qty: 10 TABLET | Refills: 0 | Status: SHIPPED | OUTPATIENT
Start: 2020-08-10 | End: 2020-08-15

## 2020-08-10 NOTE — ED PROVIDER NOTES
Patient presents with:  Cough/URI      HPI:     Felipa Zuleta is a 47year old male with a past history of asthma presents with cough and wheezing over the course of the last 8 days. No shortness of breath or chest pain.   States he does have albuterol respirations. Discussed with him viral most likely. He does have albuterol inhaler at home and states he does not need a refill. He has been on prednisone in the past for similar symptoms and states he tolerated that well.   I will hold off on any antibi

## 2020-08-12 LAB — SARS-COV-2 RNA RESP QL NAA+PROBE: NOT DETECTED

## 2020-08-14 ENCOUNTER — HOSPITAL ENCOUNTER (OUTPATIENT)
Dept: GENERAL RADIOLOGY | Age: 40
Discharge: HOME | End: 2020-08-14
Attending: INTERNAL MEDICINE
Payer: COMMERCIAL

## 2020-08-14 ENCOUNTER — APPOINTMENT (OUTPATIENT)
Dept: LAB | Age: 40
End: 2020-08-14
Attending: INTERNAL MEDICINE
Payer: COMMERCIAL

## 2020-08-14 ENCOUNTER — OFFICE VISIT (OUTPATIENT)
Dept: INTERNAL MEDICINE CLINIC | Facility: CLINIC | Age: 40
End: 2020-08-14
Payer: COMMERCIAL

## 2020-08-14 VITALS
WEIGHT: 202.88 LBS | DIASTOLIC BLOOD PRESSURE: 82 MMHG | SYSTOLIC BLOOD PRESSURE: 121 MMHG | TEMPERATURE: 98 F | HEIGHT: 69.5 IN | BODY MASS INDEX: 29.37 KG/M2 | HEART RATE: 79 BPM

## 2020-08-14 DIAGNOSIS — R41.3 MEMORY DIFFICULTIES: Primary | ICD-10-CM

## 2020-08-14 DIAGNOSIS — M54.6 LEFT-SIDED THORACIC BACK PAIN, UNSPECIFIED CHRONICITY: ICD-10-CM

## 2020-08-14 DIAGNOSIS — R41.3 MEMORY DIFFICULTIES: ICD-10-CM

## 2020-08-14 DIAGNOSIS — R41.840 DIFFICULTY CONCENTRATING: ICD-10-CM

## 2020-08-14 LAB — VIT B12 SERPL-MCNC: 465 PG/ML (ref 193–986)

## 2020-08-14 PROCEDURE — 3074F SYST BP LT 130 MM HG: CPT | Performed by: INTERNAL MEDICINE

## 2020-08-14 PROCEDURE — 3008F BODY MASS INDEX DOCD: CPT | Performed by: INTERNAL MEDICINE

## 2020-08-14 PROCEDURE — 82607 VITAMIN B-12: CPT

## 2020-08-14 PROCEDURE — 72080 X-RAY EXAM THORACOLMB 2/> VW: CPT | Performed by: INTERNAL MEDICINE

## 2020-08-14 PROCEDURE — 99214 OFFICE O/P EST MOD 30 MIN: CPT | Performed by: INTERNAL MEDICINE

## 2020-08-14 PROCEDURE — 3079F DIAST BP 80-89 MM HG: CPT | Performed by: INTERNAL MEDICINE

## 2020-08-14 PROCEDURE — 36415 COLL VENOUS BLD VENIPUNCTURE: CPT

## 2020-08-15 PROBLEM — R41.3 MEMORY DIFFICULTIES: Status: ACTIVE | Noted: 2020-08-15

## 2020-08-15 PROBLEM — M54.6 LEFT-SIDED THORACIC BACK PAIN: Status: ACTIVE | Noted: 2020-08-15

## 2020-08-15 PROBLEM — R41.840 DIFFICULTY CONCENTRATING: Status: ACTIVE | Noted: 2020-08-15

## 2020-08-15 NOTE — PROGRESS NOTES
HPI:    Patient ID: Margret Burkitt is a 47year old male. Patient presents with:  Lab Results: Concerned about some of the recent lab results especially kidney labs. Back Pain: Left lower flank pain. Onset 6 months ago. Pain with pressure.    Concentr eye.     • Albuterol Sulfate  (90 Base) MCG/ACT Inhalation Aero Soln 2 puffs every four hours as needed for wheezing/cough.  1 Inhaler 0   • loratadine (CLARITIN) 10 MG Oral Tab 1-2  Weeks than prn 30 tablet 0     Allergies:No Known Allergies   PHYSI Remember well asking what  Was  That ?      Waking  Up =  Urinates  Usually  3   Times at night and wakes up   Lot   Per patient he drinks a lot of water especially at night    Drinks 16  Ounce  3-4   Per  Day      working -  IT remotely      Nursing not

## 2020-08-28 ENCOUNTER — TELEMEDICINE (OUTPATIENT)
Dept: INTERNAL MEDICINE CLINIC | Facility: CLINIC | Age: 40
End: 2020-08-28

## 2020-08-28 DIAGNOSIS — H10.13 ALLERGIC CONJUNCTIVITIS OF BOTH EYES: Primary | ICD-10-CM

## 2020-08-28 PROCEDURE — 99213 OFFICE O/P EST LOW 20 MIN: CPT | Performed by: PHYSICIAN ASSISTANT

## 2020-08-28 NOTE — PROGRESS NOTES
This is a telemedicine visit with live, interactive video and audio. Patient understands and accepts financial responsibility for any deductible, co-insurance and/or co-pays associated with this service.     SUBJECTIVE  Presents with two days of red, it

## 2020-10-26 ENCOUNTER — HOSPITAL ENCOUNTER (OUTPATIENT)
Age: 40
Discharge: HOME | End: 2020-10-26
Attending: EMERGENCY MEDICINE
Payer: COMMERCIAL

## 2020-10-26 VITALS
HEART RATE: 89 BPM | HEIGHT: 70 IN | OXYGEN SATURATION: 96 % | DIASTOLIC BLOOD PRESSURE: 87 MMHG | RESPIRATION RATE: 20 BRPM | SYSTOLIC BLOOD PRESSURE: 124 MMHG | BODY MASS INDEX: 28.63 KG/M2 | WEIGHT: 200 LBS | TEMPERATURE: 97 F

## 2020-10-26 DIAGNOSIS — J06.9 UPPER RESPIRATORY TRACT INFECTION, UNSPECIFIED TYPE: Primary | ICD-10-CM

## 2020-10-26 PROCEDURE — 99213 OFFICE O/P EST LOW 20 MIN: CPT

## 2020-10-26 PROCEDURE — 87430 STREP A AG IA: CPT

## 2020-10-26 NOTE — ED PROVIDER NOTES
Patient Seen in: [facility]      History   Patient presents with:  Cough/URI    Stated Complaint: cold symptoms    HPI    48 yo male with sore throat that started yesterday, today cough and congestion. No documented fever.  No known COVID expo normal.              ED Course     Labs Reviewed   [facility] POCT RAPID STREP - Normal   SARS-COV-2 RNA,QUAL RT-PCR ([facility])                  MDM                         Disposition and Plan     Clinical Impression:  Upper respiratory tract infection, unspecified

## 2020-12-17 ENCOUNTER — HOSPITAL ENCOUNTER (OUTPATIENT)
Dept: GENERAL RADIOLOGY | Age: 40
Discharge: HOME | End: 2020-12-17
Attending: PODIATRIST
Payer: COMMERCIAL

## 2020-12-17 ENCOUNTER — OFFICE VISIT (OUTPATIENT)
Dept: PODIATRY CLINIC | Facility: CLINIC | Age: 40
End: 2020-12-17
Payer: COMMERCIAL

## 2020-12-17 VITALS — BODY MASS INDEX: 29.35 KG/M2 | HEIGHT: 70 IN | WEIGHT: 205 LBS

## 2020-12-17 DIAGNOSIS — M10.072 ACUTE IDIOPATHIC GOUT INVOLVING TOE OF LEFT FOOT: ICD-10-CM

## 2020-12-17 DIAGNOSIS — M79.672 FOOT PAIN, LEFT: ICD-10-CM

## 2020-12-17 DIAGNOSIS — M79.672 FOOT PAIN, LEFT: Primary | ICD-10-CM

## 2020-12-17 PROCEDURE — 3008F BODY MASS INDEX DOCD: CPT | Performed by: PODIATRIST

## 2020-12-17 PROCEDURE — 99213 OFFICE O/P EST LOW 20 MIN: CPT | Performed by: PODIATRIST

## 2020-12-17 PROCEDURE — 73630 X-RAY EXAM OF FOOT: CPT | Performed by: PODIATRIST

## 2020-12-17 RX ORDER — METHYLPREDNISOLONE 4 MG/1
TABLET ORAL
Qty: 1 PACKAGE | Refills: 0 | Status: SHIPPED | OUTPATIENT
Start: 2020-12-17 | End: 2021-11-23

## 2020-12-17 RX ORDER — METHYLPREDNISOLONE 4 MG/1
TABLET ORAL
Qty: 1 PACKAGE | Refills: 0 | Status: CANCELLED
Start: 2020-12-17

## 2020-12-17 NOTE — PROGRESS NOTES
HPI:    Patient ID: Randi Becerra is a 47year old male. 43-year-old male presents to the office having not been seen in approximately 1 year. He has a new concern today that his pain on the left forefoot.   The pain is been present for a little more t him on a Medrol Dosepak. I reviewed the use of the medication, concerns, and expectations. I instructed him that I would anticipate rather dramatic and significant improvement in the next 2 to 4 days.   If it anytime he thinks it is becoming worse or the

## 2021-07-26 ENCOUNTER — HOSPITAL ENCOUNTER (OUTPATIENT)
Age: 41
Discharge: HOME | End: 2021-07-26
Payer: COMMERCIAL

## 2021-07-26 VITALS
DIASTOLIC BLOOD PRESSURE: 77 MMHG | OXYGEN SATURATION: 97 % | TEMPERATURE: 97 F | SYSTOLIC BLOOD PRESSURE: 111 MMHG | HEART RATE: 90 BPM | RESPIRATION RATE: 20 BRPM

## 2021-07-26 DIAGNOSIS — H60.331 ACUTE SWIMMER'S EAR OF RIGHT SIDE: Primary | ICD-10-CM

## 2021-07-26 PROCEDURE — 99213 OFFICE O/P EST LOW 20 MIN: CPT

## 2021-07-26 RX ORDER — OFLOXACIN 3 MG/ML
SOLUTION AURICULAR (OTIC) DAILY
Qty: 5 ML | Refills: 0 | Status: SHIPPED | OUTPATIENT
Start: 2021-07-26 | End: 2021-08-02

## 2021-07-27 NOTE — ED PROVIDER NOTES
Patient Seen in: [facility]      History   Patient presents with:  Ear Problem Pain    Stated Complaint: right ear pain    HPI/Subjective:   HPI    This is a well appearing 45 y/o who presents with a chief complaint of ear pain.  Pt reports th and external ear normal. No mastoid tenderness. Tympanic membrane is not erythematous. Left Ear: Tympanic membrane, ear canal and external ear normal.      Ears:      Comments: +ear canal erythematous and swollen.  No mastoid tenderness      Nose: Nose I explained to the patient that emergent conditions may arise and to go to the ER for new, worsening or any persistent conditions. All questions answered. No acute distress and cleared for home.     Disposition and Plan     Clinical Impression:  Acute swimm

## 2021-07-27 NOTE — ED INITIAL ASSESSMENT (HPI)
Patient with right ear pain starting  Yesterday. Denies uri symptoms states he did swim on Thursday. Denies fevers, denies drainage from ear.

## 2021-08-27 ENCOUNTER — OFFICE VISIT (OUTPATIENT)
Dept: OTOLARYNGOLOGY | Facility: CLINIC | Age: 41
End: 2021-08-27
Payer: COMMERCIAL

## 2021-08-27 VITALS — HEIGHT: 70 IN | WEIGHT: 215 LBS | BODY MASS INDEX: 30.78 KG/M2

## 2021-08-27 DIAGNOSIS — H61.23 BILATERAL IMPACTED CERUMEN: Primary | ICD-10-CM

## 2021-08-27 PROCEDURE — 69210 REMOVE IMPACTED EAR WAX UNI: CPT | Performed by: OTOLARYNGOLOGY

## 2021-08-27 PROCEDURE — 3008F BODY MASS INDEX DOCD: CPT | Performed by: OTOLARYNGOLOGY

## 2021-08-27 NOTE — PROGRESS NOTES
Oswald Molina is a 46year old male.   Patient presents with:  Ear Problem: pt is here today for an ear cleaning and he is having a pain in his right ear, he did go to urgent care a few weeks ago and got ear drops and it went away but now this week his e ENDOSCOPY PERFORMED  6/2015   • VASECTOMY  7/1/16    Dr. Niels Spurling Neg/Pos Details   Constitutional Negative Fatigue, fever and weight loss. ENMT Negative Drooling. Eyes Negative Blurred vision and vision changes.    Respirato •  Albuterol Sulfate  (90 Base) MCG/ACT Inhalation Aero Soln, 2 puffs every four hours as needed for wheezing/cough. , Disp: 1 Inhaler, Rfl: 0  •  loratadine (CLARITIN) 10 MG Oral Tab, 1-2  Weeks than prn, Disp: 30 tablet, Rfl: 0  •  methylPREDNISo

## 2021-11-23 ENCOUNTER — OFFICE VISIT (OUTPATIENT)
Dept: INTERNAL MEDICINE CLINIC | Facility: CLINIC | Age: 41
End: 2021-11-23
Payer: COMMERCIAL

## 2021-11-23 ENCOUNTER — MED REC SCAN ONLY (OUTPATIENT)
Dept: INTERNAL MEDICINE CLINIC | Facility: CLINIC | Age: 41
End: 2021-11-23

## 2021-11-23 VITALS
SYSTOLIC BLOOD PRESSURE: 113 MMHG | HEIGHT: 70 IN | DIASTOLIC BLOOD PRESSURE: 70 MMHG | WEIGHT: 208 LBS | HEART RATE: 59 BPM | BODY MASS INDEX: 29.78 KG/M2

## 2021-11-23 DIAGNOSIS — K21.9 GERD WITH STRICTURE: ICD-10-CM

## 2021-11-23 DIAGNOSIS — Z00.00 PE (PHYSICAL EXAM), ROUTINE: Primary | ICD-10-CM

## 2021-11-23 DIAGNOSIS — L40.9 PSORIASIS: ICD-10-CM

## 2021-11-23 DIAGNOSIS — K22.2 GERD WITH STRICTURE: ICD-10-CM

## 2021-11-23 PROCEDURE — 99395 PREV VISIT EST AGE 18-39: CPT | Performed by: INTERNAL MEDICINE

## 2021-11-23 PROCEDURE — 3078F DIAST BP <80 MM HG: CPT | Performed by: INTERNAL MEDICINE

## 2021-11-23 PROCEDURE — 3074F SYST BP LT 130 MM HG: CPT | Performed by: INTERNAL MEDICINE

## 2021-11-23 PROCEDURE — 90471 IMMUNIZATION ADMIN: CPT | Performed by: INTERNAL MEDICINE

## 2021-11-23 PROCEDURE — 99212 OFFICE O/P EST SF 10 MIN: CPT | Performed by: INTERNAL MEDICINE

## 2021-11-23 PROCEDURE — 3008F BODY MASS INDEX DOCD: CPT | Performed by: INTERNAL MEDICINE

## 2021-11-23 PROCEDURE — 90686 IIV4 VACC NO PRSV 0.5 ML IM: CPT | Performed by: INTERNAL MEDICINE

## 2021-11-23 NOTE — PROGRESS NOTES
Subjective:   Patient ID: Leah Rangel is a 46year old male.   Patient presents with:  Physical    Patient presents today for physical exam, states doing well otherwise, denies chest pain, shortness of breath, dyspnea on exertion or heart palpitations and nursing note reviewed. Constitutional:       General: He is not in acute distress. Appearance: He is well-developed. Interventions: Face mask in place. HENT:      Head: Normocephalic and atraumatic.       Right Ear: Tympanic membrane, ear c Preventative health maintenance tests reviewed   Immunizations reviewed  -patient need flu shot today   needs tetanus shot full schedule next visit time  -  Patient verbalized understanding and compliance       GERD with stricture    Omeprazole 40 mg lenny

## 2022-05-17 ENCOUNTER — LAB ENCOUNTER (OUTPATIENT)
Dept: LAB | Age: 42
End: 2022-05-17
Attending: NURSE PRACTITIONER
Payer: COMMERCIAL

## 2022-05-17 ENCOUNTER — TELEMEDICINE (OUTPATIENT)
Dept: TELEHEALTH | Age: 42
End: 2022-05-17

## 2022-05-17 VITALS — TEMPERATURE: 99 F

## 2022-05-17 DIAGNOSIS — U07.1 COVID-19 VIRUS INFECTION: ICD-10-CM

## 2022-05-17 DIAGNOSIS — U07.1 COVID-19 VIRUS INFECTION: Primary | ICD-10-CM

## 2022-05-17 PROCEDURE — 99212 OFFICE O/P EST SF 10 MIN: CPT | Performed by: NURSE PRACTITIONER

## 2022-05-18 LAB — SARS-COV-2 RNA RESP QL NAA+PROBE: DETECTED

## 2022-05-30 ENCOUNTER — APPOINTMENT (OUTPATIENT)
Dept: GENERAL RADIOLOGY | Age: 42
End: 2022-05-30
Attending: EMERGENCY MEDICINE
Payer: COMMERCIAL

## 2022-05-30 ENCOUNTER — HOSPITAL ENCOUNTER (OUTPATIENT)
Age: 42
Discharge: HOME | End: 2022-05-30
Attending: EMERGENCY MEDICINE
Payer: COMMERCIAL

## 2022-05-30 VITALS
TEMPERATURE: 98 F | SYSTOLIC BLOOD PRESSURE: 128 MMHG | HEART RATE: 85 BPM | RESPIRATION RATE: 18 BRPM | OXYGEN SATURATION: 96 % | DIASTOLIC BLOOD PRESSURE: 80 MMHG

## 2022-05-30 DIAGNOSIS — S92.352A DISPLACED FRACTURE OF FIFTH METATARSAL BONE, LEFT FOOT, INITIAL ENCOUNTER FOR CLOSED FRACTURE: Primary | ICD-10-CM

## 2022-05-30 PROCEDURE — 99214 OFFICE O/P EST MOD 30 MIN: CPT

## 2022-05-30 PROCEDURE — 73630 X-RAY EXAM OF FOOT: CPT | Performed by: EMERGENCY MEDICINE

## 2022-05-31 ENCOUNTER — OFFICE VISIT (OUTPATIENT)
Dept: PODIATRY CLINIC | Facility: CLINIC | Age: 42
End: 2022-05-31
Payer: COMMERCIAL

## 2022-05-31 DIAGNOSIS — S92.355A CLOSED NONDISPLACED FRACTURE OF FIFTH METATARSAL BONE OF LEFT FOOT, INITIAL ENCOUNTER: Primary | ICD-10-CM

## 2022-05-31 PROCEDURE — 99243 OFF/OP CNSLTJ NEW/EST LOW 30: CPT | Performed by: PODIATRIST

## 2022-06-09 ENCOUNTER — TELEPHONE (OUTPATIENT)
Dept: PODIATRY CLINIC | Facility: CLINIC | Age: 42
End: 2022-06-09

## 2022-06-09 ENCOUNTER — OFFICE VISIT (OUTPATIENT)
Dept: PODIATRY CLINIC | Facility: CLINIC | Age: 42
End: 2022-06-09
Payer: COMMERCIAL

## 2022-06-09 ENCOUNTER — HOSPITAL ENCOUNTER (OUTPATIENT)
Dept: GENERAL RADIOLOGY | Age: 42
Discharge: HOME | End: 2022-06-09
Attending: PODIATRIST
Payer: COMMERCIAL

## 2022-06-09 DIAGNOSIS — Z47.89 ORTHOPEDIC AFTERCARE: ICD-10-CM

## 2022-06-09 DIAGNOSIS — Z47.89 ORTHOPEDIC AFTERCARE: Primary | ICD-10-CM

## 2022-06-09 DIAGNOSIS — S92.355D CLOSED NONDISPLACED FRACTURE OF FIFTH METATARSAL BONE OF LEFT FOOT WITH ROUTINE HEALING, SUBSEQUENT ENCOUNTER: Primary | ICD-10-CM

## 2022-06-09 PROCEDURE — L4387 NON-PNEUM WALK BOOT PRE OTS: HCPCS | Performed by: PODIATRIST

## 2022-06-09 PROCEDURE — 73630 X-RAY EXAM OF FOOT: CPT | Performed by: PODIATRIST

## 2022-06-09 PROCEDURE — 99213 OFFICE O/P EST LOW 20 MIN: CPT | Performed by: PODIATRIST

## 2022-06-09 NOTE — TELEPHONE ENCOUNTER
Per Dr Natalie Calabrese request a new order for x-rays of L foot was entered and patient will be notified to come in 15 minutes earlier for his ana cristina today and have the x-ray done first . Juan Israel

## 2022-06-09 NOTE — TELEPHONE ENCOUNTER
Patient was called relayed Dr Nelson Pappas to have Xray done before appt. Pt verbalized understanding.

## 2022-06-29 ENCOUNTER — HOSPITAL ENCOUNTER (OUTPATIENT)
Dept: GENERAL RADIOLOGY | Facility: HOSPITAL | Age: 42
Discharge: HOME | End: 2022-06-29
Attending: PODIATRIST
Payer: COMMERCIAL

## 2022-06-29 ENCOUNTER — OFFICE VISIT (OUTPATIENT)
Dept: PODIATRY CLINIC | Facility: CLINIC | Age: 42
End: 2022-06-29
Payer: COMMERCIAL

## 2022-06-29 DIAGNOSIS — S92.355D CLOSED NONDISPLACED FRACTURE OF FIFTH METATARSAL BONE OF LEFT FOOT WITH ROUTINE HEALING, SUBSEQUENT ENCOUNTER: ICD-10-CM

## 2022-06-29 DIAGNOSIS — Z47.89 ORTHOPEDIC AFTERCARE: Primary | ICD-10-CM

## 2022-06-29 DIAGNOSIS — Z47.89 ORTHOPEDIC AFTERCARE: ICD-10-CM

## 2022-06-29 PROCEDURE — 73630 X-RAY EXAM OF FOOT: CPT | Performed by: PODIATRIST

## 2022-06-29 PROCEDURE — 99213 OFFICE O/P EST LOW 20 MIN: CPT | Performed by: PODIATRIST

## 2022-07-31 ENCOUNTER — HOSPITAL ENCOUNTER (OUTPATIENT)
Age: 42
Discharge: HOME | End: 2022-07-31
Attending: EMERGENCY MEDICINE
Payer: COMMERCIAL

## 2022-07-31 VITALS
SYSTOLIC BLOOD PRESSURE: 122 MMHG | RESPIRATION RATE: 21 BRPM | DIASTOLIC BLOOD PRESSURE: 66 MMHG | HEART RATE: 74 BPM | OXYGEN SATURATION: 99 % | TEMPERATURE: 98 F

## 2022-07-31 DIAGNOSIS — S05.01XA ABRASION OF RIGHT CORNEA, INITIAL ENCOUNTER: Primary | ICD-10-CM

## 2022-07-31 PROCEDURE — 99213 OFFICE O/P EST LOW 20 MIN: CPT

## 2022-07-31 RX ORDER — OFLOXACIN 3 MG/ML
2 SOLUTION/ DROPS OPHTHALMIC 4 TIMES DAILY
Qty: 5 ML | Refills: 0 | Status: SHIPPED | OUTPATIENT
Start: 2022-07-31 | End: 2022-08-05

## 2022-07-31 NOTE — ED INITIAL ASSESSMENT (HPI)
Patient with right eye irritation starting late Friday evening. States he was out to dinner Friday night when a fly flew into his eye.  + redness and drainage from the eye noted since then. Patient using antihistamine drops as well. Denies contact lens use.

## 2022-08-01 ENCOUNTER — TELEPHONE (OUTPATIENT)
Dept: OPHTHALMOLOGY | Facility: CLINIC | Age: 42
End: 2022-08-01

## 2022-08-10 ENCOUNTER — HOSPITAL ENCOUNTER (OUTPATIENT)
Dept: GENERAL RADIOLOGY | Facility: HOSPITAL | Age: 42
Discharge: HOME | End: 2022-08-10
Attending: PODIATRIST
Payer: COMMERCIAL

## 2022-08-10 ENCOUNTER — OFFICE VISIT (OUTPATIENT)
Dept: PODIATRY CLINIC | Facility: CLINIC | Age: 42
End: 2022-08-10
Payer: COMMERCIAL

## 2022-08-10 DIAGNOSIS — Z47.89 ORTHOPEDIC AFTERCARE: Primary | ICD-10-CM

## 2022-08-10 DIAGNOSIS — S92.355D CLOSED NONDISPLACED FRACTURE OF FIFTH METATARSAL BONE OF LEFT FOOT WITH ROUTINE HEALING, SUBSEQUENT ENCOUNTER: ICD-10-CM

## 2022-08-10 DIAGNOSIS — Z47.89 ORTHOPEDIC AFTERCARE: ICD-10-CM

## 2022-08-10 PROCEDURE — 99213 OFFICE O/P EST LOW 20 MIN: CPT | Performed by: PODIATRIST

## 2022-08-10 PROCEDURE — 73630 X-RAY EXAM OF FOOT: CPT | Performed by: PODIATRIST

## 2022-10-25 ENCOUNTER — MED REC SCAN ONLY (OUTPATIENT)
Dept: INTERNAL MEDICINE CLINIC | Facility: CLINIC | Age: 42
End: 2022-10-25

## 2023-01-26 ENCOUNTER — MED REC SCAN ONLY (OUTPATIENT)
Dept: INTERNAL MEDICINE CLINIC | Facility: CLINIC | Age: 43
End: 2023-01-26

## 2023-08-03 ENCOUNTER — MED REC SCAN ONLY (OUTPATIENT)
Dept: INTERNAL MEDICINE CLINIC | Facility: CLINIC | Age: 43
End: 2023-08-03

## 2023-09-11 ENCOUNTER — TELEPHONE (OUTPATIENT)
Facility: CLINIC | Age: 43
End: 2023-09-11

## 2023-09-11 NOTE — TELEPHONE ENCOUNTER
Thank you    Your Appointments      Wednesday September 13, 2023  7:30 AM  Consult with [facility]  [facility] [address] [facility], [address]  [phone]

## 2023-09-11 NOTE — TELEPHONE ENCOUNTER
Patient requesting to be seen for consult, as he is having problems with acid reflux, fatigue, stomach pains after he eats, a lot bloating, having soft stools, back pain and having dizziness, having painful bowel movements and bad joint pain.

## 2023-09-11 NOTE — TELEPHONE ENCOUNTER
Left message to call back  I tried to call x2 first time it rang once and dropped. At the time of my call Dr. Sumanth Wu had an opening tomorrow and Stefany Truong and José Judd had openings on Wednesday - since not seen in over 3 years ok to offer these if he calls back if still available.

## 2023-09-13 ENCOUNTER — OFFICE VISIT (OUTPATIENT)
Facility: CLINIC | Age: 43
End: 2023-09-13

## 2023-09-13 VITALS — HEART RATE: 76 BPM | SYSTOLIC BLOOD PRESSURE: 157 MMHG | DIASTOLIC BLOOD PRESSURE: 87 MMHG

## 2023-09-13 DIAGNOSIS — R10.30 ABDOMINAL PAIN, LOWER: Primary | ICD-10-CM

## 2023-09-13 DIAGNOSIS — R13.19 ESOPHAGEAL DYSPHAGIA: ICD-10-CM

## 2023-09-13 DIAGNOSIS — K21.9 GASTROESOPHAGEAL REFLUX DISEASE, UNSPECIFIED WHETHER ESOPHAGITIS PRESENT: ICD-10-CM

## 2023-09-13 PROCEDURE — 99244 OFF/OP CNSLTJ NEW/EST MOD 40: CPT

## 2023-09-13 PROCEDURE — 3077F SYST BP >= 140 MM HG: CPT

## 2023-09-13 PROCEDURE — 3079F DIAST BP 80-89 MM HG: CPT

## 2023-09-13 RX ORDER — RISANKIZUMAB-RZAA 150 MG/ML
INJECTION SUBCUTANEOUS
COMMUNITY

## 2023-12-13 ENCOUNTER — OFFICE VISIT (OUTPATIENT)
Dept: INTERNAL MEDICINE CLINIC | Facility: CLINIC | Age: 43
End: 2023-12-13

## 2023-12-13 ENCOUNTER — HOSPITAL ENCOUNTER (OUTPATIENT)
Dept: GENERAL RADIOLOGY | Age: 43
Discharge: HOME | End: 2023-12-13
Attending: INTERNAL MEDICINE
Payer: COMMERCIAL

## 2023-12-13 VITALS
SYSTOLIC BLOOD PRESSURE: 115 MMHG | WEIGHT: 215.69 LBS | HEIGHT: 70 IN | DIASTOLIC BLOOD PRESSURE: 75 MMHG | HEART RATE: 73 BPM | BODY MASS INDEX: 30.88 KG/M2

## 2023-12-13 DIAGNOSIS — K22.2 GERD WITH STRICTURE: ICD-10-CM

## 2023-12-13 DIAGNOSIS — Z00.00 PE (PHYSICAL EXAM), ROUTINE: Primary | ICD-10-CM

## 2023-12-13 DIAGNOSIS — M54.42 CHRONIC LEFT-SIDED LOW BACK PAIN WITH LEFT-SIDED SCIATICA: ICD-10-CM

## 2023-12-13 DIAGNOSIS — G89.29 CHRONIC LEFT-SIDED LOW BACK PAIN WITH LEFT-SIDED SCIATICA: ICD-10-CM

## 2023-12-13 DIAGNOSIS — K21.9 GERD WITH STRICTURE: ICD-10-CM

## 2023-12-13 PROCEDURE — 72100 X-RAY EXAM L-S SPINE 2/3 VWS: CPT | Performed by: INTERNAL MEDICINE

## 2023-12-13 PROCEDURE — 90686 IIV4 VACC NO PRSV 0.5 ML IM: CPT | Performed by: INTERNAL MEDICINE

## 2023-12-13 PROCEDURE — 3074F SYST BP LT 130 MM HG: CPT | Performed by: INTERNAL MEDICINE

## 2023-12-13 PROCEDURE — 3078F DIAST BP <80 MM HG: CPT | Performed by: INTERNAL MEDICINE

## 2023-12-13 PROCEDURE — 90471 IMMUNIZATION ADMIN: CPT | Performed by: INTERNAL MEDICINE

## 2023-12-13 PROCEDURE — 99213 OFFICE O/P EST LOW 20 MIN: CPT | Performed by: INTERNAL MEDICINE

## 2023-12-13 PROCEDURE — 3008F BODY MASS INDEX DOCD: CPT | Performed by: INTERNAL MEDICINE

## 2023-12-13 PROCEDURE — 99396 PREV VISIT EST AGE 40-64: CPT | Performed by: INTERNAL MEDICINE

## 2023-12-13 RX ORDER — OMEPRAZOLE 40 MG/1
40 CAPSULE, DELAYED RELEASE ORAL DAILY
Qty: 90 CAPSULE | Refills: 1 | Status: SHIPPED | OUTPATIENT
Start: 2023-12-13 | End: 2024-06-10

## 2023-12-13 NOTE — PROGRESS NOTES
Subjective:   Patient ID: May Ji is a 41year old male. Chief Complaint   Patient presents with    Physical     Patient presents today for physical exam, states doing well otherwise, but pt have some lower back pain and left  tight pain -burning sensation on and off especially if he is standing for prolonged. Of time and if there is kneeling been in [employer] standing and playing. Patient states this is going on for 1 year on and off but lately has been worsening patient states she does not have pain with just the regular activities. Patient denies pain going all the way down the leg. There is no weakness of the leg or other symptoms or complaints. Denies chest pain, shortness of breath, dyspnea on exertion or heart palpitations also denies nausea, vomiting, diarrhea, constipation or abdominal pain. No fever, chills, or UTI symptoms. The rest of the review of systems, see below. Patient states he has some heartburns lately he was taking ibuprofen for his low back pain. He is taking only over-the-counter omeprazole 20 mg twice daily that is relieving the burning pain but he is not taking medications regularly. Patient has history of GERD with strictures  Gastro-esophageal Reflux  He complains of heartburn (ocaasional ). He reports no abdominal pain, no chest pain, no coughing, no nausea, no sore throat or no wheezing. The problem occurs occasionally. The problem has been gradually improving. Pertinent negatives include no fatigue. He has tried a PPI for the symptoms. The treatment provided significant relief. Past procedures include an EGD. History/Other:   Review of Systems   Constitutional:  Negative for chills, fatigue and fever. HENT:  Negative for ear pain and sore throat. Eyes:  Negative for pain and redness. Respiratory:  Negative for cough, shortness of breath and wheezing. Cardiovascular:  Negative for chest pain, palpitations and leg swelling.    Gastrointestinal:  Positive for heartburn (ocaasional ). Negative for abdominal pain, constipation, diarrhea, nausea and vomiting. Genitourinary:  Negative for dysuria and frequency. Musculoskeletal:         Left  tight  burning sensation w long standing  1  year     Lower back pain  on off - radiate  to  left  knee    Skin:  Negative for pallor. Neurological:  Negative for dizziness and headaches. Psychiatric/Behavioral:  Negative for confusion. The patient is not nervous/anxious. Current Outpatient Medications   Medication Sig Dispense Refill    Risankizumab-rzaa (SKYRIZI) 150 MG/ML Subcutaneous Solution Prefilled Syringe Inject into the skin every 3 (three) months. OMEPRAZOLE OR Take 1 tablet by mouth as needed. Ketotifen Fumarate (ZADITOR OP) Apply to eye.      loratadine (CLARITIN) 10 MG Oral Tab 1-2  Weeks than prn 30 tablet 0    Albuterol Sulfate  (90 Base) MCG/ACT Inhalation Aero Soln 2 puffs every four hours as needed for wheezing/cough. (Patient not taking: Reported on 12/13/2023) 1 Inhaler 0     Allergies:No Known Allergies    Objective:   Physical Exam  Vitals and nursing note reviewed. Constitutional:       General: He is not in acute distress. Appearance: He is well-developed. Interventions: Face mask in place. HENT:      Head: Normocephalic and atraumatic. Right Ear: Tympanic membrane, ear canal and external ear normal. There is no impacted cerumen. Left Ear: Tympanic membrane, ear canal and external ear normal. There is no impacted cerumen. Nose: Nose normal.      Right Sinus: No maxillary sinus tenderness or frontal sinus tenderness. Left Sinus: No maxillary sinus tenderness or frontal sinus tenderness. Eyes:      General: No scleral icterus. Right eye: No discharge. Left eye: No discharge. Neck:      Thyroid: No thyromegaly. Vascular: No JVD. Cardiovascular:      Rate and Rhythm: Normal rate and regular rhythm.       Heart sounds: Normal heart sounds. No murmur heard. Pulmonary:      Effort: Pulmonary effort is normal. No respiratory distress. Breath sounds: Normal breath sounds. No wheezing or rales. Abdominal:      Palpations: Abdomen is soft. There is no mass. Tenderness: There is no abdominal tenderness. There is no right CVA tenderness or left CVA tenderness. Genitourinary:     Comments: gu  Deferred - pt state feels good checking treatises and no penile lesions or discharge   Musculoskeletal:      Cervical back: Neck supple. Lumbar back: No spasms or bony tenderness. Normal range of motion. Negative right straight leg raise test and negative left straight leg raise test. No scoliosis. Right lower leg: No tenderness. No edema. Left lower leg: No edema. Comments: Patient has negative straight leg test on the left side patient just developed some tingling sensation but no pain   Lymphadenopathy:      Cervical: No cervical adenopathy. Skin:     General: Skin is warm and dry. Neurological:      General: No focal deficit present. Mental Status: He is alert and oriented to person, place, and time. Psychiatric:         Behavior: Behavior normal.     Blood pressure 115/75, pulse 73, height 5' 10\" (1.778 m), weight 215 lb 11.2 oz (97.8 kg).         Assessment & Plan:   PE (physical exam), routine  (primary encounter diagnosis)  Maintain a healthy diet , low saturated fat and low sugar diet  Keep good hydration  Maintain a regular activity /walking as tolerated   Complete labs as ordered,   Preventative health maintenance tests reviewed   Immunizations reviewed  -patient need flu shot today   needs tetanus shot full schedule next visit time  Patient agrees with plan verbalized understanding compliance  Patient verbalized understanding and compliance       GERD with stricture  Omeprazole 40 mg every day   Patient advised to avoid spicy food, coffee, tea, caffeinated drinks, alcohol, acidic food/juices  Advised to avoid NSAID's - Aspirin-based medications  Advised to avoid wearing  tight clothes   Advised to elevate the head of the bed   Avoid eating at least 3 hours before bedtime   Counseling on ideal weight/BMI  Take PPIs qd in the morning 30-60 minutes before breakfast always on empty stomach Side effects and directions of medication discussed with patient. Patient verbalized understanding and compliance. Lower back pain   For 1 year recommend patient completed the x-ray of the lumbar spine recommend also to avoid the heavy lifting avoid any    Position that bothers his back and provoke the pain. Patient to avoid ibuprofen due to GERD symptoms  He also can take Tylenol 500 mg twice daily as needed for the pain   Recommend patient to see the physical therapy doctor for further evaluation treatment and physical therapy. Patient agrees with plan verbalized understanding compliance    Hx Psoriasis  Eczema   Refer  To Dermatologist   Dr Rustam savage    Flu shot today     Obesity with BMI over 30  Eat healthy, well balanced meals   Reduce daily calorie intake    Reach and maintain a healthy weight   Reduce salt, fat, sweets and pure sugar in diet   Include in your diet:  fruits, vegetables, low-saturated fat diet (lean chicken, turkey, and fish)  Exercise/walk regularly as tolerated  The risks and benefits of the treatment plan have been discussed with the patient   Discussed with patient to try to lose some weight  Recommend some exercises as tolerated walking is good as long as it does not bother his back  Patient to use the portions cut down the calories  Discussed low saturated fat diet less red meat fried food labs to be completed    Patient can have Tdap the next visit time or he can schedule appointment with nurse for Tdap. No orders of the defined types were placed in this encounter.       Meds This Visit:  Requested Prescriptions      No prescriptions requested or ordered in this encounter Imaging & Referrals:  PHYSIATRY - INTERNAL  XR LUMBAR SPINE (MIN 2 VIEWS) (CPT=72100)

## 2023-12-22 ENCOUNTER — LAB ENCOUNTER (OUTPATIENT)
Dept: LAB | Age: 43
End: 2023-12-22
Attending: INTERNAL MEDICINE
Payer: COMMERCIAL

## 2023-12-22 DIAGNOSIS — Z00.00 PE (PHYSICAL EXAM), ROUTINE: ICD-10-CM

## 2023-12-22 LAB
ALBUMIN SERPL-MCNC: 4.7 G/DL (ref 3.2–4.8)
ALBUMIN/GLOB SERPL: 1.4 {RATIO} (ref 1–2)
ALP LIVER SERPL-CCNC: 52 U/L
ALT SERPL-CCNC: 22 U/L
ANION GAP SERPL CALC-SCNC: 11 MMOL/L (ref 0–18)
AST SERPL-CCNC: 22 U/L (ref ?–34)
BASOPHILS # BLD AUTO: 0.05 X10(3) UL (ref 0–0.2)
BASOPHILS NFR BLD AUTO: 0.7 %
BILIRUB SERPL-MCNC: 0.5 MG/DL (ref 0.3–1.2)
BUN BLD-MCNC: 17 MG/DL (ref 9–23)
BUN/CREAT SERPL: 15.7 (ref 10–20)
CALCIUM BLD-MCNC: 9.4 MG/DL (ref 8.7–10.4)
CHLORIDE SERPL-SCNC: 102 MMOL/L (ref 98–112)
CHOLEST SERPL-MCNC: 174 MG/DL (ref ?–200)
CO2 SERPL-SCNC: 26 MMOL/L (ref 21–32)
CREAT BLD-MCNC: 1.08 MG/DL
DEPRECATED RDW RBC AUTO: 36.4 FL (ref 35.1–46.3)
EGFRCR SERPLBLD CKD-EPI 2021: 88 ML/MIN/1.73M2 (ref 60–?)
EOSINOPHIL # BLD AUTO: 0.32 X10(3) UL (ref 0–0.7)
EOSINOPHIL NFR BLD AUTO: 4.7 %
ERYTHROCYTE [DISTWIDTH] IN BLOOD BY AUTOMATED COUNT: 11.9 % (ref 11–15)
FASTING PATIENT LIPID ANSWER: YES
FASTING STATUS PATIENT QL REPORTED: YES
GLOBULIN PLAS-MCNC: 3.3 G/DL (ref 2.8–4.4)
GLUCOSE BLD-MCNC: 98 MG/DL (ref 70–99)
HCT VFR BLD AUTO: 41.9 %
HDLC SERPL-MCNC: 33 MG/DL (ref 40–59)
HGB BLD-MCNC: 14.4 G/DL
IMM GRANULOCYTES # BLD AUTO: 0.01 X10(3) UL (ref 0–1)
IMM GRANULOCYTES NFR BLD: 0.1 %
LDLC SERPL CALC-MCNC: 123 MG/DL (ref ?–100)
LYMPHOCYTES # BLD AUTO: 2.21 X10(3) UL (ref 1–4)
LYMPHOCYTES NFR BLD AUTO: 32.3 %
MCH RBC QN AUTO: 29.3 PG (ref 26–34)
MCHC RBC AUTO-ENTMCNC: 34.4 G/DL (ref 31–37)
MCV RBC AUTO: 85.3 FL
MONOCYTES # BLD AUTO: 0.41 X10(3) UL (ref 0.1–1)
MONOCYTES NFR BLD AUTO: 6 %
NEUTROPHILS # BLD AUTO: 3.84 X10 (3) UL (ref 1.5–7.7)
NEUTROPHILS # BLD AUTO: 3.84 X10(3) UL (ref 1.5–7.7)
NEUTROPHILS NFR BLD AUTO: 56.2 %
NONHDLC SERPL-MCNC: 141 MG/DL (ref ?–130)
OSMOLALITY SERPL CALC.SUM OF ELEC: 290 MOSM/KG (ref 275–295)
PLATELET # BLD AUTO: 286 10(3)UL (ref 150–450)
POTASSIUM SERPL-SCNC: 4.2 MMOL/L (ref 3.5–5.1)
PROT SERPL-MCNC: 8 G/DL (ref 5.7–8.2)
RBC # BLD AUTO: 4.91 X10(6)UL
SODIUM SERPL-SCNC: 139 MMOL/L (ref 136–145)
TRIGL SERPL-MCNC: 94 MG/DL (ref 30–149)
TSI SER-ACNC: 1.16 MIU/ML (ref 0.55–4.78)
VLDLC SERPL CALC-MCNC: 17 MG/DL (ref 0–30)
WBC # BLD AUTO: 6.8 X10(3) UL (ref 4–11)

## 2023-12-22 PROCEDURE — 84443 ASSAY THYROID STIM HORMONE: CPT

## 2023-12-22 PROCEDURE — 85025 COMPLETE CBC W/AUTO DIFF WBC: CPT

## 2023-12-22 PROCEDURE — 80061 LIPID PANEL: CPT

## 2023-12-22 PROCEDURE — 36415 COLL VENOUS BLD VENIPUNCTURE: CPT

## 2023-12-22 PROCEDURE — 81015 MICROSCOPIC EXAM OF URINE: CPT

## 2023-12-22 PROCEDURE — 80053 COMPREHEN METABOLIC PANEL: CPT

## 2023-12-24 ENCOUNTER — HOSPITAL ENCOUNTER (OUTPATIENT)
Age: 43
Discharge: HOME | End: 2023-12-24
Attending: STUDENT IN AN ORGANIZED HEALTH CARE EDUCATION/TRAINING PROGRAM
Payer: COMMERCIAL

## 2023-12-24 VITALS
OXYGEN SATURATION: 97 % | TEMPERATURE: 98 F | HEART RATE: 78 BPM | DIASTOLIC BLOOD PRESSURE: 89 MMHG | SYSTOLIC BLOOD PRESSURE: 123 MMHG | RESPIRATION RATE: 16 BRPM

## 2023-12-24 DIAGNOSIS — J00 ACUTE RHINITIS: ICD-10-CM

## 2023-12-24 DIAGNOSIS — H65.191 OTHER NON-RECURRENT ACUTE NONSUPPURATIVE OTITIS MEDIA OF RIGHT EAR: Primary | ICD-10-CM

## 2023-12-24 PROCEDURE — 99213 OFFICE O/P EST LOW 20 MIN: CPT

## 2023-12-24 RX ORDER — AMOXICILLIN AND CLAVULANATE POTASSIUM 875; 125 MG/1; MG/1
1 TABLET, FILM COATED ORAL 2 TIMES DAILY
Qty: 14 TABLET | Refills: 0 | Status: SHIPPED | OUTPATIENT
Start: 2023-12-24 | End: 2023-12-31

## 2023-12-24 NOTE — DISCHARGE INSTRUCTIONS
Your exam is consistent with otitis media of the right ear which is a bacterial infection of the ear drum. I have prescribed oral antibiotics called Augmentin to help treat this infection. Symptoms should begin to improve within 72 hours on antibiotics, if there is no improvement or if you develop any new, changing, progressing, or worsening signs or symptoms, you should present immediately to your primary care physician for reassessment. If you develop any redness, pain, or swelling behind the ears, you should present immediately to the emergency department for assessment. You also have inflammation of the nasal mucosa called rhinitis which is likely contributing to your symptoms. I recommend over-the-counter intranasal Flonase so as to help decrease nasal mucosal swelling and so as to facilitate forward drainage of secretions to decrease pressure/fluid in the ears, drainage on the throat and into the lungs, and pressure in the sinuses. Please ensure you follow-up with your primary care physician within the next 3 to 4 days for reassessment and for further recommendations as the antibiotics will treat the bacterial infection of the ear but given your recent cold and associated rhinitis you may have fluid within the ear/effusion that could be contributing to symptoms.

## 2024-01-03 ENCOUNTER — HOSPITAL ENCOUNTER (OUTPATIENT)
Dept: GENERAL RADIOLOGY | Age: 44
Discharge: HOME | End: 2024-01-03
Attending: PHYSICAL MEDICINE & REHABILITATION
Payer: COMMERCIAL

## 2024-01-03 ENCOUNTER — OFFICE VISIT (OUTPATIENT)
Dept: PHYSICAL MEDICINE AND REHAB | Facility: CLINIC | Age: 44
End: 2024-01-03
Payer: COMMERCIAL

## 2024-01-03 VITALS — WEIGHT: 215 LBS | HEIGHT: 70 IN | BODY MASS INDEX: 30.78 KG/M2

## 2024-01-03 DIAGNOSIS — M25.859 FEMOROACETABULAR IMPINGEMENT: ICD-10-CM

## 2024-01-03 DIAGNOSIS — M54.16 LEFT LUMBAR RADICULOPATHY: Primary | ICD-10-CM

## 2024-01-03 PROCEDURE — 3008F BODY MASS INDEX DOCD: CPT | Performed by: PHYSICAL MEDICINE & REHABILITATION

## 2024-01-03 PROCEDURE — 99204 OFFICE O/P NEW MOD 45 MIN: CPT | Performed by: PHYSICAL MEDICINE & REHABILITATION

## 2024-01-03 PROCEDURE — 73502 X-RAY EXAM HIP UNI 2-3 VIEWS: CPT | Performed by: PHYSICAL MEDICINE & REHABILITATION

## 2024-01-03 RX ORDER — MELOXICAM 15 MG/1
15 TABLET ORAL DAILY
Qty: 14 TABLET | Refills: 0 | Status: SHIPPED | OUTPATIENT
Start: 2024-01-03 | End: 2024-01-17

## 2024-01-03 NOTE — PROGRESS NOTES
[facility]  NEW PATIENT EVALUATION    Consultation as a request of Dr. Durham      HISTORY OF PRESENT ILLNESS:     Chief Complaint   Patient presents with    Low Back Pain     New right handed patient comes in for left low back pain that radiate to anterior/lateral knee with N/T. Pain started 1 yr ago, no injury. XR Lumbar. No PT/injections. Takes Tylenol with relief. Rates pain 4/10.       The patient is a 43 year old male with significant past medical history of GERD, psoriatic arthritis who presents with left-sided low back pain.  Patient states pain has been ongoing for the last year with recent progression.  He has had x-ray imaging of the lumbar spine.  He takes Tylenol over-the-counter to manage his symptoms.  Pain is located in the anterior lateral thigh up to the knee which can be burning in nature.  He oftentimes will notice tingling sensation when he is at [employer] and kneeling.  Pain is worse with prolonged standing as well.  Notices the pain can improve with sitting.  He denies any further radiating symptoms distally past the knee in the shin calf or foot.  He denies any weakness, any saddle anesthesia, any loss of bowel bladder control, any fevers chills or weight loss.  He rates pain to be 4 out of 10 today.  He has not any dedicated treatment for this.  He denies any groin pain in particular.    PHYSICAL EXAM:   Ht 70\"   Wt 215 lb (97.5 kg)   BMI 30.85 kg/m²     Gait  Able to toe walk and heel walk without any difficulty    LUMBAR SPINE:  Inspection: no erythema, swelling, or obvious deformity.  Their iliac crest and shoulder heights are symmetrical.     Palpation: Non tender to palpation of the spinous process.   ROM: FAROM but restricted due to hamstring tightness  Strength: 5/5 in bilateral lower extremities  Sensation: Intact to light touch in all dermatomes of the lower extremities  Reflexes: 2/4 at L4 and S1  Facet Loading: no specific facet  pain  Straight leg raise: negative for radicular pain symptoms  Slump test: negative for pain symptoms for radicular pain symptoms  Dayana and FADIR: Positive mildly for left anterior lateral hip pain    IMAGING:     X-ray lumbar spine completed on 12/13/2023 was personally reviewed which is notable for mild degenerative changes with transitional lumbosacral anatomy with lumbarization of the S1 vertebrae.    All imaging results were reviewed and discussed with patient.      ASSESSMENT/PLAN:     1. Left lumbar radiculopathy    2. Femoroacetabular impingement        Kranthi Mcwilliams is a pleasant 43-year-old male presenting today for evaluation of chronic left anterior and lateral thigh pain.  We discussed today that his symptoms may be related to a lumbar disc herniation in the L5 distribution but could also be from femoral acetabular impingement in the hip or meralgia paresthetica.  I have recommended he start a PT program with home exercises today.  I also prescribed him meloxicam 15 mg to be taken daily for the next 7 to 10 days.  Recommend he follow-up in 4 weeks.  I have also recommended x-ray imaging of the left hip today on the way out.  If pain is not improved we will consider MRI imaging for further evaluation.      The patient verbalized understanding with the plan and was in agreement. All questions/concerns were addressed and there were no barriers to learning.  Please note Dragon dictation software was used to dictate this note and may result in inadvertent typos.    Jesi Dawn DO, FAAPMR & CAQSM  Physical Medicine and Rehabilitation  Sports and Spine Medicine    PAST MEDICAL HISTORY:     Past Medical History:   Diagnosis Date    Deviated septum     Esophageal reflux     Lipid screening 04-    per [employer]    Psoriatic arthritis (HCC)     SEASONAL ALLERGIES          PAST SURGICAL HISTORY:     Past Surgical History:   Procedure Laterality Date    OTHER SURGICAL HISTORY      circumcision age 11     UPPER GI ENDOSCOPY PERFORMED  6/2015    VASECTOMY  7/1/16    Dr. Kam         CURRENT MEDICATIONS:     Current Outpatient Medications   Medication Sig Dispense Refill    Meloxicam (MOBIC) 15 MG Oral Tab Take 1 tablet (15 mg total) by mouth daily for 14 days. 14 tablet 0    Omeprazole 40 MG Oral Capsule Delayed Release Take 1 capsule (40 mg total) by mouth daily. Take 30-60 minutes before breakfast 90 capsule 1    Risankizumab-rzaa (SKYRIZI) 150 MG/ML Subcutaneous Solution Prefilled Syringe Inject into the skin every 3 (three) months.      Ketotifen Fumarate (ZADITOR OP) Apply to eye.      loratadine (CLARITIN) 10 MG Oral Tab 1-2  Weeks than prn 30 tablet 0    Albuterol Sulfate  (90 Base) MCG/ACT Inhalation Aero Soln 2 puffs every four hours as needed for wheezing/cough. (Patient not taking: Reported on 12/13/2023) 1 Inhaler 0         ALLERGIES:   No Known Allergies      FAMILY HISTORY:     Family History   Problem Relation Age of Onset    No Known Problems Father     No Known Problems Mother     Cancer Other         great uncle    Heart Disorder Neg           SOCIAL HISTORY:     Social History     Socioeconomic History    Marital status:    Tobacco Use    Smoking status: Never    Smokeless tobacco: Never   Vaping Use    Vaping Use: Never used   Substance and Sexual Activity    Alcohol use: Yes     Comment: 1-2 x a month    Drug use: No   Other Topics Concern    Caffeine Concern Yes     Comment: Coffee, 1 cup daily          REVIEW OF SYSTEMS:   No patient-reported data collected this visit.      PHYSICAL EXAM:   General: No immediate distress  Head: Normocephalic/ Atraumatic  Eyes: Extra-occular movements intact.   Ears: No auricular hematoma or deformities  Mouth: No lesions or ulcerations  Heart: peripheral pulses intact. Normal capillary refill.   Lungs: Non-labored respirations  Abdomen: No abdominal guarding  Extremities: No lower extremity edema bilaterally   Skin: No lesions noted   Cognition:  alert & oriented x 3, attentive, able to follow 2 step commands, comprehention intact, spontaneous speech intact  Psychiatric: Mood and affect appropriate      LABS:   No results found for: \"EAG\", \"A1C\"  Lab Results   Component Value Date    WBC 6.8 12/22/2023    RBC 4.91 12/22/2023    HGB 14.4 12/22/2023    HCT 41.9 12/22/2023    MCV 85.3 12/22/2023    MCH 29.3 12/22/2023    MCHC 34.4 12/22/2023    RDW 11.9 12/22/2023    .0 12/22/2023    MPV 8.9 09/02/2016     Lab Results   Component Value Date    GLU 98 12/22/2023    BUN 17 12/22/2023    BUNCREA 15.7 12/22/2023    CREATSERUM 1.08 12/22/2023    ANIONGAP 11 12/22/2023    GFRNAA 79 06/11/2020    GFRAA 91 06/11/2020    CA 9.4 12/22/2023    OSMOCALC 290 12/22/2023    ALKPHO 52 12/22/2023    AST 22 12/22/2023    ALT 22 12/22/2023    ALKPHOS 65 09/02/2016    BILT 0.5 12/22/2023    TP 8.0 12/22/2023    ALB 4.7 12/22/2023    GLOBULIN 3.3 12/22/2023    AGRATIO 1.0 09/02/2016     12/22/2023    K 4.2 12/22/2023     12/22/2023    CO2 26.0 12/22/2023     No results found for: \"PTP\", \"PT\", \"INR\"  Lab Results   Component Value Date    VITD 22.9 11/09/2017

## 2024-01-03 NOTE — PATIENT INSTRUCTIONS
Physical therapy and home exercises  X-ray of the left hip today  Mobic 15 mg daily for the next 7 to 10 days  Follow-up in 4 weeks  If no better we will consider MRI imaging of the lumbar spine and/or hip

## 2024-03-16 ENCOUNTER — HOSPITAL ENCOUNTER (OUTPATIENT)
Age: 44
Discharge: HOME | End: 2024-03-16
Payer: COMMERCIAL

## 2024-03-16 ENCOUNTER — APPOINTMENT (OUTPATIENT)
Dept: GENERAL RADIOLOGY | Age: 44
End: 2024-03-16
Attending: Physician Assistant
Payer: COMMERCIAL

## 2024-03-16 VITALS
SYSTOLIC BLOOD PRESSURE: 131 MMHG | OXYGEN SATURATION: 98 % | TEMPERATURE: 98 F | HEART RATE: 67 BPM | DIASTOLIC BLOOD PRESSURE: 73 MMHG | RESPIRATION RATE: 20 BRPM

## 2024-03-16 DIAGNOSIS — S93.602A FOOT SPRAIN, LEFT, INITIAL ENCOUNTER: Primary | ICD-10-CM

## 2024-03-16 PROCEDURE — 99213 OFFICE O/P EST LOW 20 MIN: CPT

## 2024-03-16 PROCEDURE — 99214 OFFICE O/P EST MOD 30 MIN: CPT

## 2024-03-16 PROCEDURE — 73630 X-RAY EXAM OF FOOT: CPT | Performed by: PHYSICIAN ASSISTANT

## 2024-03-16 PROCEDURE — 73610 X-RAY EXAM OF ANKLE: CPT | Performed by: PHYSICIAN ASSISTANT

## 2024-03-16 NOTE — ED PROVIDER NOTES
Chief Complaint   Patient presents with    Leg or Foot Injury       History obtained from: patient   services not used     HPI:     Kranthi Mcwilliams is a 44 year old male who presents with left foot injury sustained this morning approximately 2 hours prior to arrival.  Patient states he was walking his dogs when he rolled his left foot.  Patient complains of pain and swelling to lateral aspect of left foot without radiation.  Patient states he broke a bone in this area doing the same thing less than a year ago and followed up with podiatry.  Patient took ibuprofen at home.  Patient ambulating with steady gait using walking boot from home.  Denies fall, head injury, neck pain, back pain, wound, change in skin color/temperature, numbness, weakness.    PMH  Past Medical History:   Diagnosis Date    Deviated septum     Esophageal reflux     Lipid screening 04-    per [employer]    Psoriatic arthritis (HCC)     SEASONAL ALLERGIES        PFSH    PFS asessment screens reviewed and agree.  Nurses notes reviewed I agree with documentation.    Family History   Problem Relation Age of Onset    No Known Problems Father     No Known Problems Mother     Cancer Other         great uncle    Heart Disorder Neg      Family history reviewed with patient/caregiver and is not pertinent to presenting problem.  Social History     Socioeconomic History    Marital status:      Spouse name: Not on file    Number of children: Not on file    Years of education: Not on file    Highest education level: Not on file   Occupational History    Not on file   Tobacco Use    Smoking status: Never    Smokeless tobacco: Never   Vaping Use    Vaping Use: Never used   Substance and Sexual Activity    Alcohol use: Yes     Comment: 1-2 x a month    Drug use: No    Sexual activity: Not on file   Other Topics Concern     Service Not Asked    Blood Transfusions Not Asked    Caffeine Concern Yes     Comment: Coffee, 1 cup daily     Vascular Access Note    Patient is on 2 Cardiac and has an order for antibiotics after discharge and needs a  midline catheter for 9 days of Meropenum.    Using maximum barrier precautions and sterile technique, inserted a Bard Power Injectable Midline 4F Catheter using ultrasound guidance. Catheter was trimmed at 14 cm in the left upper arm, basilic vein. Patient's arm circumference is 28 cm with 0 cm out of the arm. Neutral injection cap was attached and catheter flushes easily with blood return. Biopatch, statloc, and occlusive dressing were placed at the site. Patient tolerated procedure.      Reported to RUSSELL Patterson       Occupational Exposure Not Asked    Hobby Hazards Not Asked    Sleep Concern Not Asked    Stress Concern Not Asked    Weight Concern Not Asked    Special Diet Not Asked    Back Care Not Asked    Exercise Not Asked    Bike Helmet Not Asked    Seat Belt Not Asked    Self-Exams Not Asked   Social History Narrative    Not on file     Social Determinants of Health     Financial Resource Strain: Not on file   Food Insecurity: Not on file   Transportation Needs: Not on file   Physical Activity: Not on file   Stress: Not on file   Social Connections: Not on file   Housing Stability: Not on file         ROS:   Positive for stated complaint: Left foot pain  All other systems reviewed and negative except as noted above.  Constitutional and Vital Signs Reviewed.    Physical Exam:     Findings:    /73   Pulse 67   Temp 97.8 °F (36.6 °C) (Temporal)   Resp 20   SpO2 98%   GENERAL: well developed, no acute distress, non-toxic appearing   SKIN: good skin turgor, no obvious rashes  HEAD: normocephalic, atraumatic  EYES: sclera non-icteric bilaterally, conjunctiva clear bilaterally  OROPHARYNX: MMM, maintaining airway and secretions  NECK: no nuchal rigidity, no trismus, no edema, phonation normal    CARDIO: Regular rate, DP pulse 2+ bilaterally, cap refill less than 2 seconds  LUNGS: No increased WOB  EXTREMITIES: Swelling and tenderness to lateral left ankle and foot, no obvious deformity, F ROM, compartments soft, CMS intact, skin intact without overlying changes  NEURO: no focal deficits  PSYCH: alert and oriented x3, answering questions appropriately, mood appropriate    MDM/Assessment/Plan:   Orders for this encounter:    Orders Placed This Encounter    XR FOOT, COMPLETE (MIN 3 VIEWS), LEFT (CPT=73630)     Order Specific Question:   What is the Relevant Clinical Indication / Reason for Exam?     Answer:   foot injury (left foot)     Order Specific Question:   Release to patient     Answer:   Immediate    XR ANKLE (MIN 3  VIEWS), LEFT (CPT=73610)     Order Specific Question:   What is the Relevant Clinical Indication / Reason for Exam?     Answer:   twisted ankle walking     Order Specific Question:   Release to patient     Answer:   Immediate       Labs performed this visit:  No results found for this or any previous visit (from the past 10 hour(s)).    Imaging performed this visit:  XR FOOT, COMPLETE (MIN 3 VIEWS), LEFT (CPT=73630)   Final Result   PROCEDURE: XR FOOT, COMPLETE (MIN 3 VIEWS), LEFT (CPT=73630)       COMPARISON: [facility] 2nd Floor, XR FOOT    WEIGHTBEARING (3 VIEWS), LEFT (CPT=73630), 8/10/2022, 3:49 PM.  [facility] 2nd Floor, XR FOOT WEIGHTBEARING (3 VIEWS),    LEFT (CPT=73630), 6/29/2022, 8:22 AM.     [facility] [place] [facility], XR FOOT, COMPLETE (MIN 3    VIEWS), LEFT (CPT=73630), 6/09/2022, 10:02 AM.       INDICATIONS: Left lateral foot pain and swelling post rolling foot this    morning.       TECHNIQUE: 3 views were obtained.         FINDINGS:    BONES:    There is chronic appearing irregularity involving the base of the fifth    metatarsal, at the location of a previously seen comminuted fracture.     Portions of the fracture remain visible/lucent.  There is no cortical    step-off seen.  There has been    significant interval healing/osseous bridging across the fracture    fragments since the 08/10/2022 examination.    No evidence of osseous malalignment.  Chronic appearing erosive change    involving the lateral marginal aspect 5th metatarsal head again noted.   SOFT TISSUES: Mild soft tissue swelling over the lateral aspect of the    midfoot.   EFFUSION: None visible.    OTHER: Negative.                    =====   CONCLUSION:    Moderate soft tissue swelling over the lateral aspect of the midfoot.     Partially persistently visualized lucent fracture lines involving the base    of the fifth metatarsal, suggestive of malunion.  There has  however been    significant interval healing since    the 08/10/2022 examination.  No definite acute fracture seen.                       Dictated by (CST): Marjorie Cameron MD on 3/16/2024 at 10:33 AM        Finalized by (CST): Marjorie Cameron MD on 3/16/2024 at 10:37 AM               XR ANKLE (MIN 3 VIEWS), LEFT (CPT=73610)   Final Result   PROCEDURE: XR ANKLE (MIN 3 VIEWS), LEFT (CPT=73610)       COMPARISON: [facility] 2nd Floor, XR FOOT    WEIGHTBEARING (3 VIEWS), LEFT (CPT=73630), 6/29/2022, 8:22 AM.       INDICATIONS: Left lateral ankle pain post rolling foot this morning.       TECHNIQUE: 3 views were obtained.         FINDINGS:    BONES: No acute fracture or osseous malalignment.  Joint spaces are    maintained.  Chronic appearing deformity at the base of the fifth    metatarsal.  Small Achilles tendon attachment enthesophyte.  Accessory    ossicle dorsal to the talus.  Chronic appearing    ossification is seen distal to the tip of the lateral malleolus,    suggestive of sequela of prior/remote injury or other process.   SOFT TISSUES: Negative. No visible soft tissue swelling.    EFFUSION: None visible.    OTHER: Negative.                    =====   CONCLUSION:    No acute ankle fracture.               Dictated by (CST): Marjorie Cameron MD on 3/16/2024 at 10:37 AM        Finalized by (CST): Marjorie Cameron MD on 3/16/2024 at 10:38 AM                   Medical Decision Making  DDx includes fracture versus contusion versus sprain versus other.  No signs of neurovascular compromise or compartment syndrome.  X-ray reviewed, no evidence of acute fracture in the foot or ankle, findings suggestive of malunion of previous fifth metatarsal fracture however significant interval healing since previous imaging.  Discussed these findings with patient.  Ace wrap applied to left foot.  Patient comfortable walking with walking boot from home, recommend patient use this until he follows up with  podiatry.  Discussed supportive care including rest, ice, elevation, and OTC Tylenol/Motrin as needed for pain.  Instructed patient to go directly to nearest ER with any worsening or concerning symptoms.  Follow-up with podiatry.    Amount and/or Complexity of Data Reviewed  External Data Reviewed: radiology.     Details: Previous x-rays of left foot reviewed from August 2022  Radiology: ordered and independent interpretation performed.    Risk  OTC drugs.          Diagnosis:    ICD-10-CM    1. Foot sprain, left, initial encounter  S93.602A           All results reviewed and discussed with patient/patient's family. Patient/patient's family verbalize excellent understanding of instructions and feels comfortable with plan. All of patient's/patient's family's questions were addressed.   See AVS for detailed discharge instructions for your condition today.    Follow Up with:  Omega Mayfield DPM  [address]  [facility] [address]  [phone]    Schedule an appointment as soon as possible for a visit         Julia Mathew PA-C

## 2024-03-16 NOTE — DISCHARGE INSTRUCTIONS
Rest, ice, and elevate foot  Alternate Motrin/Tylenol as needed for pain   Wear boot when walking   Drink plenty of fluids   Get plenty of rest   Avoid excessive twisting, bending, or lifting   Follow up with podiatry

## 2024-03-16 NOTE — ED INITIAL ASSESSMENT (HPI)
Left foot pain with swelling sustained when he rolled his foot while walking his dogs this am, cms intact

## 2024-09-18 ENCOUNTER — TELEPHONE (OUTPATIENT)
Dept: PHYSICAL MEDICINE AND REHAB | Facility: CLINIC | Age: 44
End: 2024-09-18

## 2024-09-18 ENCOUNTER — HOSPITAL ENCOUNTER (OUTPATIENT)
Age: 44
Discharge: HOME | End: 2024-09-18
Payer: COMMERCIAL

## 2024-09-18 VITALS
TEMPERATURE: 97 F | DIASTOLIC BLOOD PRESSURE: 83 MMHG | HEART RATE: 72 BPM | SYSTOLIC BLOOD PRESSURE: 125 MMHG | RESPIRATION RATE: 18 BRPM | OXYGEN SATURATION: 100 %

## 2024-09-18 DIAGNOSIS — M54.50 BILATERAL LOW BACK PAIN, UNSPECIFIED CHRONICITY, UNSPECIFIED WHETHER SCIATICA PRESENT: Primary | ICD-10-CM

## 2024-09-18 PROCEDURE — 99213 OFFICE O/P EST LOW 20 MIN: CPT

## 2024-09-18 RX ORDER — CYCLOBENZAPRINE HCL 10 MG
10 TABLET ORAL 3 TIMES DAILY PRN
Qty: 10 TABLET | Refills: 0 | Status: SHIPPED | OUTPATIENT
Start: 2024-09-18

## 2024-09-18 RX ORDER — PREDNISONE 20 MG/1
40 TABLET ORAL DAILY
Qty: 10 TABLET | Refills: 0 | Status: SHIPPED | OUTPATIENT
Start: 2024-09-18 | End: 2024-09-23

## 2024-09-18 NOTE — TELEPHONE ENCOUNTER
Patient calling to request a transfer of care from Jesi Phillip to Mike Martin this is for a 2nd opinion. Please advice

## 2024-09-18 NOTE — ED PROVIDER NOTES
Patient Seen in: [facility] [place]      History     Chief Complaint   Patient presents with    Back Pain     Stated Complaint: lower back pain    Subjective:   HPI    This is a 44-year-old male with a history of low back pain, esophageal reflux, psoriatic arthritis presenting with low back pain.  Patient states he has been dealing with low back pain off and on for about a year saw a physiatrist last year had x-rays done and did not really have an answer for why he was having the back pain but did offer physical therapy which she declined to do at that time.  Patient states 8 days ago he started to have generalized lower back pain on both sides it is intermittent it got worse after stretching 4 days ago as he tried some different stretches there is pain with certain movements and touch and the pain sometimes radiates down the left leg.  Denies any saddle paresthesia.  Denies any bowel or bladder incontinence denies frequency urgency or dysuria abdominal pain nausea or vomiting.  Denies falling and landing on his back.  + Took ibuprofen around 7 AM this morning.    Objective:   Past Medical History:    Deviated septum    Esophageal reflux    Lipid screening    per [employer]    Psoriatic arthritis (HCC)    SEASONAL ALLERGIES              Past Surgical History:   Procedure Laterality Date    Other surgical history      circumcision age 11    Upper gi endoscopy performed  6/2015    Vasectomy  7/1/16    Dr. Kam                Social History     Socioeconomic History    Marital status:    Tobacco Use    Smoking status: Never    Smokeless tobacco: Never   Vaping Use    Vaping status: Never Used   Substance and Sexual Activity    Alcohol use: Yes     Comment: 1-2 x a month    Drug use: No   Other Topics Concern    Caffeine Concern Yes     Comment: Coffee, 1 cup daily              Review of Systems    Positive for stated Chief Complaint: Back Pain    Other systems are as noted in HPI.  Constitutional and vital signs  reviewed.      All other systems reviewed and negative except as noted above.    Physical Exam     ED Triage Vitals [09/18/24 0849]   /83   Pulse 72   Resp 18   Temp 97.2 °F (36.2 °C)   Temp src Temporal   SpO2 100 %   O2 Device None (Room air)       Current Vitals:   Vital Signs  BP: 125/83  Pulse: 72  Resp: 18  Temp: 97.2 °F (36.2 °C)  Temp src: Temporal    Oxygen Therapy  SpO2: 100 %  O2 Device: None (Room air)            Physical Exam  Vitals and nursing note reviewed.   Constitutional:       Appearance: Normal appearance.   HENT:      Right Ear: External ear normal.      Left Ear: External ear normal.      Nose: Nose normal.      Mouth/Throat:      Mouth: Mucous membranes are moist.      Pharynx: Oropharynx is clear.   Eyes:      Conjunctiva/sclera: Conjunctivae normal.   Abdominal:      Palpations: Abdomen is soft.      Tenderness: There is no abdominal tenderness. There is no right CVA tenderness or left CVA tenderness.   Musculoskeletal:         General: Normal range of motion.      Cervical back: Normal range of motion.      Lumbar back: Spasms and tenderness present. Negative right straight leg raise test and negative left straight leg raise test.        Back:    Skin:     General: Skin is warm.      Capillary Refill: Capillary refill takes less than 2 seconds.   Neurological:      General: No focal deficit present.      Mental Status: He is alert and oriented to person, place, and time.               ED Course   Labs Reviewed - No data to display                 MDM                        Medical Decision Making  44-year-old male nontoxic-appearing ambulatory with steady gait presenting with low back pain has been an issue off and on for a year but 8 days ago developed more back pain that sometimes radiates down the left leg.  DDx lumbar strain versus back spasms versus acute low back pain with sciatica.  Noted in patient's history he had a lumbar spine x-ray in December 2023 given no fall or trauma  no clinical indication for labs or imaging.  Discussed with the patient this is likely musculoskeletal.  Discussed and recommended following up with physiatry a different resource will be provided to follow-up with.  Discussed treatment with prednisone and cyclobenzaprine May continue over-the-counter ibuprofen may try lidocaine patch or IcyHot lidocaine patch will be applied here in [facility].  Discussed strict ER precautions with any new or worsening symptoms.  Discussed side effects of the medication.  Patient feels comfortable with the plan of care and acknowledges understanding discharge instructions.    Risk  OTC drugs.  Prescription drug management.        Disposition and Plan     Clinical Impression:  1. Bilateral low back pain, unspecified chronicity, unspecified whether sciatica present         Disposition:  Discharge  9/18/2024  9:04 am    Follow-up:  Mike Rapp MD  [address] [facility] [address]  [facility] [address] [phone]    Call   Resource for physiatry to follow-up with    Ana Durham MD  [address] [place] [address] [phone]                Medications Prescribed:  Discharge Medication List as of 9/18/2024  9:08 AM        START taking these medications    Details   predniSONE 20 MG Oral Tab Take 2 tablets (40 mg total) by mouth daily for 5 days., Normal, Disp-10 tablet, R-0      cyclobenzaprine 10 MG Oral Tab Take 1 tablet (10 mg total) by mouth 3 (three) times daily as needed for Muscle spasms (May cause drowsiness no driving or operating machinery while taking this medication)., Normal, Disp-10 tablet, R-0

## 2024-09-18 NOTE — DISCHARGE INSTRUCTIONS
Continue over-the-counter ibuprofen and Tylenol for pain you may continue over-the-counter lidocaine patch or IcyHot or you may alternate heat or ice start the prednisone and cyclobenzaprine as prescribed prednisone can cause you to stay awake so it is best that you take it early in the morning with food on the stomach cyclobenzaprine may cause drowsiness no driving or operating machinery while taking this medication call to set up an appointment with the physiatrist.  If you wake up cannot feel your lower extremities or ambulate unintentionally urinate or have a bowel movement on yourself or any new or worsening symptoms go to the nearest emergency department.

## 2024-09-18 NOTE — ED INITIAL ASSESSMENT (HPI)
Patient arrived ambulatory to room c/o generalized lower back pain that started 8 days ago. Pain has been intermittent. Worsened after stretching 4 days ago. Pain worsens with movement. Radiates to the left leg. No urinary/bowel complaints. Ambulating with steady gait.

## 2024-09-25 ENCOUNTER — HOSPITAL ENCOUNTER (OUTPATIENT)
Age: 44
Discharge: HOME | End: 2024-09-25
Payer: COMMERCIAL

## 2024-09-25 VITALS
TEMPERATURE: 97 F | SYSTOLIC BLOOD PRESSURE: 122 MMHG | DIASTOLIC BLOOD PRESSURE: 86 MMHG | HEART RATE: 76 BPM | OXYGEN SATURATION: 97 % | RESPIRATION RATE: 20 BRPM

## 2024-09-25 DIAGNOSIS — H10.33 ACUTE BACTERIAL CONJUNCTIVITIS OF BOTH EYES: Primary | ICD-10-CM

## 2024-09-25 PROCEDURE — 99213 OFFICE O/P EST LOW 20 MIN: CPT

## 2024-09-25 RX ORDER — POLYMYXIN B SULFATE AND TRIMETHOPRIM 1; 10000 MG/ML; [USP'U]/ML
1 SOLUTION OPHTHALMIC
Qty: 10 ML | Refills: 0 | Status: SHIPPED | OUTPATIENT
Start: 2024-09-25 | End: 2024-09-30

## 2024-09-25 NOTE — ED PROVIDER NOTES
Patient Seen in: [facility]      History     Chief Complaint   Patient presents with    Eye Problem     Stated Complaint: redness in both eyes  Subjective:   HPI    This is a well-appearing 44-year-old with a history of GERD, psoriatic arthritis who presents with bilateral eye irritation, redness, itching and drainage which started this morning.  Son had pinkeye last week, daughter with pinkeye now.  No pain with EOM.  Does not wear contact lenses.    Objective:   Past Medical History:    Deviated septum    Esophageal reflux    Lipid screening    per [employer]    Psoriatic arthritis (HCC)    SEASONAL ALLERGIES            Past Surgical History:   Procedure Laterality Date    Other surgical history      circumcision age 11    Upper gi endoscopy performed  6/2015    Vasectomy  7/1/16    Dr. Kam              Social History     Socioeconomic History    Marital status:    Tobacco Use    Smoking status: Never    Smokeless tobacco: Never   Vaping Use    Vaping status: Never Used   Substance and Sexual Activity    Alcohol use: Yes     Comment: 1-2 x a month    Drug use: No   Other Topics Concern    Caffeine Concern Yes     Comment: Coffee, 1 cup daily            Review of Systems   All other systems reviewed and are negative.      Positive for stated complaint: Eye Problem    Other systems are as noted in HPI.  Constitutional and vital signs reviewed.      All other systems reviewed and negative except as noted above.    Physical Exam     ED Triage Vitals [09/25/24 1755]   /86   Pulse 76   Resp 20   Temp 97.4 °F (36.3 °C)   Temp src Temporal   SpO2 97 %   O2 Device None (Room air)     Current:/86   Pulse 76   Temp 97.4 °F (36.3 °C) (Temporal)   Resp 20   SpO2 97%   Right Eye Chart Acuity: 20/20, Uncorrected  Left Eye Chart Acuity: 20/25, Uncorrected  Physical Exam  Vitals and nursing note reviewed.   Constitutional:       General: He is awake. He is not in acute distress.     Appearance: Normal  appearance. He is not ill-appearing, toxic-appearing or diaphoretic.   HENT:      Head: Normocephalic and atraumatic.      Right Ear: Tympanic membrane, ear canal and external ear normal.      Left Ear: Tympanic membrane, ear canal and external ear normal.      Nose: Nose normal.      Mouth/Throat:      Mouth: Mucous membranes are moist.      Pharynx: Oropharynx is clear. Uvula midline.   Eyes:      General: Lids are normal.      Extraocular Movements: Extraocular movements intact.      Conjunctiva/sclera:      Right eye: Right conjunctiva is injected.      Left eye: Left conjunctiva is injected.      Pupils: Pupils are equal, round, and reactive to light.      Slit lamp exam:     Right eye: No hyphema.      Left eye: No hyphema.      Comments: Bilateral purulent drainage, able to open and close lid without difficulty.  No pain with EOM   Cardiovascular:      Rate and Rhythm: Normal rate and regular rhythm.      Pulses: Normal pulses.      Heart sounds: Normal heart sounds.   Pulmonary:      Effort: Pulmonary effort is normal.      Breath sounds: Normal breath sounds.   Skin:     General: Skin is warm and dry.      Capillary Refill: Capillary refill takes less than 2 seconds.   Neurological:      General: No focal deficit present.      Mental Status: He is alert and oriented to person, place, and time.   Psychiatric:         Mood and Affect: Mood normal.         Behavior: Behavior normal. Behavior is cooperative.         Thought Content: Thought content normal.         Judgment: Judgment normal.       ED Course     No results found.  Labs Reviewed - No data to display    MDM     Medical Decision Making  Differential diagnoses reflecting the complexity of care include but are not limited to viral versus bacterial conjunctivitis.    Comorbidities that add complexity to management include: N/A  History obtained by an independent source was from: N/A  Discussions of management was done with: N/A  My independent  interpretations of studies include: N/A  Shared decision making was done by: N/A  Patient is well appearing, non-toxic and in no acute distress.  Vital signs are stable.   Discussed with the patient we will treat for bacterial conjunctivitis at this time with Polytrim.  Patient to follow-up with PCP as needed.  Patient verbalized Medicare and states understanding        Disposition and Plan     Clinical Impression:  1. Acute bacterial conjunctivitis of both eyes         Disposition:  Discharge  9/25/2024  6:06 pm    Follow-up:  Ana Durham MD  [address] [place] [address]  [phone]                Medications Prescribed:  Discharge Medication List as of 9/25/2024  6:08 PM        START taking these medications    Details   polymyxin B-trimethoprim 66985-1.1 UNIT/ML-% Ophthalmic Solution Apply 1 drop to eye Q3H While Awake for 5 days., Normal, Disp-10 mL, R-0                Note to patient: The 21st Century cares act makes medical notes like these available to patients in the interest of transparency.  However, be advised this medical document and is intended as peer to peer communication.  It is read the medical language and may contain abbreviations or verbiage that are unfamiliar.  It may appear blunt or direct.  Medical documents are intended to carry relevant information, fax is evident and the clinical opinion of the practitioner.    This note was prepared using Dragon Medical voice recognition dictation software.  As a result, errors may occur.  When identified, these errors have been corrected.  While every attempt is made to correct errors during dictation, discrepancies may still exist.    DIANA Magallon  9/25/2024  6:06 PM

## 2024-09-25 NOTE — ED INITIAL ASSESSMENT (HPI)
Onset of bilateral eye irritation this morning. + redness, itching, and drainage. Son with pink eye last week.

## 2025-01-23 ENCOUNTER — MED REC SCAN ONLY (OUTPATIENT)
Dept: INTERNAL MEDICINE CLINIC | Facility: CLINIC | Age: 45
End: 2025-01-23

## 2025-07-22 ENCOUNTER — MED REC SCAN ONLY (OUTPATIENT)
Dept: INTERNAL MEDICINE CLINIC | Facility: CLINIC | Age: 45
End: 2025-07-22

## (undated) NOTE — ED AVS SNAPSHOT
[facility] in [address] [place] [id number]    Phone:  [phone]    Fax:  [phone]           Bryon Espinoza   MRN: [id number]    Department:  [facility] in [address] [place] [address]   Date of Visit: Discharge References/Attachments     BRONCHITIS, NO ANTIBIOTIC (ADULT) (ENGLISH)      Disclosure     Insurance plans vary and the physician(s) referred by the Im[facility] may not be covered by your plan.   It is possible that the physician may not partic IF THERE IS ANY CHANGE OR WORSENING OF YOUR CONDITION, CALL YOUR PRIMARY CARE PHYSICIAN AT ONCE OR GO TO THE EMERGENCY DEPARTMENT.     If you have been prescribed any medication(s), please fill your prescription right away and begin taking the medication(s) you to explore options for quitting.     - If you have concerns related to behavioral health issues or thoughts of harming yourself, contact [address] [place] [address] at [phone].     - If you don’t have insurance, Cameron Turner

## (undated) NOTE — LETTER
Date & Time: 9/18/2024, 9:04 AM  Patient: Kranthi Mcwilliams  Encounter Provider(s):    Marcia Erazo APRN       To Whom It May Concern:    Kranthi Mcwilliams was seen and treated in our department on 9/18/2024. He should not return into office to work until 9/23/2024.  If you have any questions or concerns, please do not hesitate to call.    TWAN Bah    _____________________________  Physician/APC Signature

## (undated) NOTE — MR AVS SNAPSHOT
[facility]  [address] [place] [address], [address] [place] [address]               Thank you for choosing us for your health care visit with Katharine Parks MD.  We are glad to serve you and happy to provide you with this summary * loratadine 10 MG Tabs   Take 1 tablet (10 mg total) by mouth daily. What changed: You were already taking a medication with the same name, and this prescription was added. Make sure you understand how and when to take each.    Commonly known as:  CLAR

## (undated) NOTE — LETTER
08/14/19        Deepali Teofilo  [address] [id number]      Dear Ella Martinez,    [address] records indicate that you have outstanding lab work and or testing that was ordered for you and has not yet been completed:       [facility]   -- call 3

## (undated) NOTE — Clinical Note
Caryn Hassan Md  [address] [place] [address], [facility]       02/17/2017        Patient: Keyon Dowling   YOB: 1980   Date of Visit: 2/16/2017       Dear  Dr. Rosemarie Ogden MD,      Thank you for referring Keyon Dowling to my pract

## (undated) NOTE — LETTER
12/23/21        [facility]  [address]  [place] [address]      Dear Faheem Reusing records indicate that you have outstanding lab work and or testing that was ordered for you and has not yet been completed:  Orders Placed This Encounter      CB

## (undated) NOTE — LETTER
AUTHORIZATION FOR SURGICAL OPERATION OR OTHER PROCEDURE    1.  I hereby [facility] Bright Biggs[facility] assigned to my case to perform the following operation and/or procedure at the [facility]:    __________Cortisone and aspirati Patient Name:  ______________________________________________________  (please print)      Patient signature:  ___________________________________________________             Relationship to Patient:           []  Parent    Responsible person

## (undated) NOTE — MR AVS SNAPSHOT
Tammie  [address]  [phone]               Thank you for choosing us for your health care visit with Mikie Forde.   We are glad to serve you and happy to provide you with this oconnor - Montelukast Sodium 10 MG Tabs      You can get these medications from any pharmacy     Bring a paper prescription for each of these medications    - Pseudoephedrine HCl  MG Tb12            [facility]               Educational Information     Health[facility] D

## (undated) NOTE — MR AVS SNAPSHOT
[address]  [phone]               Thank you for choosing us for your health care visit with Karla Shannon MD.  We are glad to serve you and happy to provide you with this summar Where to Get Your Medications      These medications were sent to [address], [address] [facility] [address], [phone], [phone]  [address] [id number]     Phone:  [phone]